# Patient Record
Sex: FEMALE | Race: OTHER | HISPANIC OR LATINO | Employment: FULL TIME | URBAN - METROPOLITAN AREA
[De-identification: names, ages, dates, MRNs, and addresses within clinical notes are randomized per-mention and may not be internally consistent; named-entity substitution may affect disease eponyms.]

---
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
## 2023-01-06 ENCOUNTER — TELEPHONE (OUTPATIENT)
Facility: HOSPITAL | Age: 18
End: 2023-01-06

## 2023-01-06 DIAGNOSIS — Z13.79 GENETIC TESTING: Primary | ICD-10-CM

## 2023-01-06 NOTE — TELEPHONE ENCOUNTER
----- Message from Patt Starkey MD sent at 1/5/2023  7:39 AM EST -----  I have reviewed the patient's lab results which are normal   Please contact the patient to inform her of the normal results, unless Ms Sal Guido has already reviewed the results using [address]

## 2023-01-06 NOTE — TELEPHONE ENCOUNTER
Left VMM for pt with results of NIPS, gender NOT provided  PT instructed on MSAFP being ordered by OB and timing of the test   Pt instructed to contact office with questions

## 2023-01-09 ENCOUNTER — PATIENT OUTREACH (OUTPATIENT)
Dept: OBGYN CLINIC | Facility: CLINIC | Age: 18
End: 2023-01-09

## 2023-01-18 ENCOUNTER — HOSPITAL ENCOUNTER (EMERGENCY)
Facility: HOSPITAL | Age: 18
Discharge: HOME | End: 2023-01-18
Attending: EMERGENCY MEDICINE

## 2023-01-18 VITALS
WEIGHT: 173 LBS | RESPIRATION RATE: 18 BRPM | OXYGEN SATURATION: 100 % | DIASTOLIC BLOOD PRESSURE: 79 MMHG | SYSTOLIC BLOOD PRESSURE: 137 MMHG | TEMPERATURE: 99.5 F | BODY MASS INDEX: 32.69 KG/M2 | HEART RATE: 110 BPM

## 2023-01-18 DIAGNOSIS — O98.512 COVID-19 AFFECTING PREGNANCY IN SECOND TRIMESTER: Primary | ICD-10-CM

## 2023-01-18 DIAGNOSIS — U07.1 COVID-19 AFFECTING PREGNANCY IN SECOND TRIMESTER: Primary | ICD-10-CM

## 2023-01-18 LAB
FLUAV RNA RESP QL NAA+PROBE: NEGATIVE
FLUBV RNA RESP QL NAA+PROBE: NEGATIVE
RSV RNA RESP QL NAA+PROBE: NEGATIVE
S PYO DNA THROAT QL NAA+PROBE: NOT DETECTED
SARS-COV-2 RNA RESP QL NAA+PROBE: POSITIVE

## 2023-01-18 NOTE — Clinical Note
Chandan Abid was seen and treated in our emergency department on 1/18/2023  Diagnosis:     Teodora    She may return on this date: 01/21/2023         If you have any questions or concerns, please don't hesitate to call        Jeff Glass DO    ______________________________           _______________          _______________  Hospital Representative                              Date                                Time

## 2023-01-19 NOTE — ED PROVIDER NOTES
History  Chief Complaint   Patient presents with   • Cold Like Symptoms     Started this morning  Feels like throat is swollen and having excess mucous  No meds PTA  21year old female, currently in 2nd semester of pregnancy, had a gender reveal party several days ago and may have been around sick people then  No with viral symptoms of sore throat, body aches and excess mucous  No abdominal pain  No urinary symptoms  No vaginal discharge or bleeding  Prior to Admission Medications   Prescriptions Last Dose Informant Patient Reported? Taking? Prenatal Vit-Fe Fumarate-FA (Prenatal Complete) 14-0 4 MG TABS   No No   Sig: Take 1 tablet by mouth in the morning   doxylamine (UNISOM) 25 MG tablet   No No   Sig: Take 1 tablet (25 mg total) by mouth daily at bedtime as needed for sleep      Facility-Administered Medications: None       Past Medical History:   Diagnosis Date   • Asthma        History reviewed  No pertinent surgical history  Family History   Problem Relation Age of Onset   • Hypertension Mother    • Diabetes Maternal Grandmother    • Cancer Maternal Grandmother      I have reviewed and agree with the history as documented  E-Cigarette/Vaping   • E-Cigarette Use Never User      E-Cigarette/Vaping Substances   • Nicotine No    • THC No    • CBD No    • Flavoring No    • Other No    • Unknown No      Social History     Tobacco Use   • Smoking status: Never   • Smokeless tobacco: Never   Vaping Use   • Vaping Use: Never used   Substance Use Topics   • Alcohol use: Not Currently   • Drug use: Not Currently       Review of Systems   Constitutional: Negative  Negative for chills and fever  HENT: Positive for congestion  Negative for rhinorrhea, sore throat, trouble swallowing and voice change  Eyes: Negative  Negative for pain and visual disturbance  Respiratory: Negative  Negative for cough, shortness of breath and wheezing  Cardiovascular: Negative    Negative for chest pain and palpitations  Gastrointestinal: Negative for abdominal pain, diarrhea, nausea and vomiting  Genitourinary: Negative  Negative for dysuria and frequency  Musculoskeletal: Positive for myalgias  Negative for neck pain and neck stiffness  Skin: Negative  Negative for rash  Neurological: Negative  Negative for dizziness, speech difficulty, weakness, light-headedness and numbness  Physical Exam  Physical Exam  Vitals and nursing note reviewed  Constitutional:       General: She is not in acute distress  Appearance: She is well-developed  HENT:      Head: Normocephalic and atraumatic  Eyes:      Conjunctiva/sclera: Conjunctivae normal       Pupils: Pupils are equal, round, and reactive to light  Neck:      Trachea: No tracheal deviation  Cardiovascular:      Rate and Rhythm: Normal rate and regular rhythm  Pulmonary:      Effort: Pulmonary effort is normal  No respiratory distress  Breath sounds: Normal breath sounds  No wheezing or rales  Abdominal:      General: Bowel sounds are normal  There is no distension  Palpations: Abdomen is soft  Tenderness: There is no abdominal tenderness  There is no guarding or rebound  Musculoskeletal:         General: No tenderness or deformity  Normal range of motion  Cervical back: Normal range of motion and neck supple  Skin:     General: Skin is warm and dry  Capillary Refill: Capillary refill takes less than 2 seconds  Findings: No rash  Neurological:      Mental Status: She is alert and oriented to person, place, and time     Psychiatric:         Behavior: Behavior normal          Vital Signs  ED Triage Vitals [01/18/23 1837]   Temperature Pulse Respirations Blood Pressure SpO2   99 5 °F (37 5 °C) (!) 110 18 137/79 100 %      Temp Source Heart Rate Source Patient Position - Orthostatic VS BP Location FiO2 (%)   Tympanic Monitor -- Left arm --      Pain Score       --           Vitals:    01/18/23 1837   BP: 137/79   Pulse: (!) 110         Visual Acuity      ED Medications  Medications - No data to display    Diagnostic Studies  Results Reviewed     Procedure Component Value Units Date/Time    FLU/RSV/COVID - if FLU/RSV clinically relevant [[id number]]  (Abnormal) Collected: 01/18/23 1845    Lab Status: Final result Specimen: Nares from Nose Updated: 01/18/23 1929     SARS-CoV-2 Positive     INFLUENZA A PCR Negative     INFLUENZA B PCR Negative     RSV PCR Negative    Narrative:      FOR PEDIATRIC PATIENTS - copy/paste COVID Guidelines URL to browser: [email or web address]  Virally    SARS-CoV-2 assay is a Nucleic Acid Amplification assay intended for the  qualitative detection of nucleic acid from SARS-CoV-2 in nasopharyngeal  swabs  Results are for the presumptive identification of SARS-CoV-2 RNA  Positive results are indicative of infection with SARS-CoV-2, the virus  causing COVID-19, but do not rule out bacterial infection or co-infection  with other viruses  Laboratories within the [place] and its  territories are required to report all positive results to the appropriate  public health authorities  Negative results do not preclude SARS-CoV-2  infection and should not be used as the sole basis for treatment or other  patient management decisions  Negative results must be combined with  clinical observations, patient history, and epidemiological information  This test has not been FDA cleared or approved  This test has been authorized by FDA under an Emergency Use Authorization  (EUA)  This test is only authorized for the duration of time the  declaration that circumstances exist justifying the authorization of the  emergency use of an in vitro diagnostic tests for detection of SARS-CoV-2  virus and/or diagnosis of COVID-19 infection under section 564(b)(1) of  the Act, 21 U  S C  163VQM-2(Q)(7), unless the authorization is terminated  or revoked sooner   The test has been validated but independent review by FDA  and CLIA is pending  Test performed using [employer] GeneXpert: This RT-PCR assay targets N2,  a region unique to SARS-CoV-2  A conserved region in the E-gene was chosen  for pan-Sarbecovirus detection which includes SARS-CoV-2  According to CMS-2020-01-R, this platform meets the definition of high-throughput technology  Strep A PCR [[id number]]  (Normal) Collected: 01/18/23 1845    Lab Status: Final result Specimen: Throat Updated: 01/18/23 1918     STREP A PCR Not Detected                 No orders to display              Procedures  Procedures         ED Course         CRAFFT    Flowsheet Row Most Recent Value   SBIRT (13-21 yo)    In order to provide better care to our patients, we are screening all of our patients for alcohol and drug use  Would it be okay to ask you these screening questions? No Filed at: 01/18/2023 1842                                          Medical Decision Making  Patient covid positive  No respiratory distress  No concerns for fetal distress or demise at this time  Patient aware of follow and return precautions  COVID-19 affecting pregnancy in second trimester: complicated acute illness or injury  Amount and/or Complexity of Data Reviewed  Labs: ordered  Disposition  Final diagnoses:   CTMNL-31 affecting pregnancy in second trimester     Time reflects when diagnosis was documented in both MDM as applicable and the Disposition within this note     Time User Action Codes Description Comment    1/18/2023  7:37 PM Chris Bridges Add [F27 214,  U07 1] COVID-19 affecting pregnancy in second trimester       ED Disposition     ED Disposition   Discharge    Condition   Stable    Date/Time   Wed Jan 18, 2023 [facility] discharge to home/self care                 Follow-up Information     Follow up With Specialties Details Why [address], MD Pediatrics   [address] [facility]  [address]  [facility] [address]  [address] [facility] Schedule an appointment as soon as possible for a visit   [address] [facility] [address] [address]  [address] [facility] [address]  [facility], [address] [facility] [address] [place], [place], [address] [phone]          Discharge Medication List as of 1/18/2023  7:38 PM      CONTINUE these medications which have NOT CHANGED    Details   doxylamine (UNISOM) 25 MG tablet Take 1 tablet (25 mg total) by mouth daily at bedtime as needed for sleep, Starting Thu 12/29/2022, Normal      Prenatal Vit-Fe Fumarate-FA (Prenatal Complete) 14-0 4 MG TABS Take 1 tablet by mouth in the morning, Starting Mon 10/24/2022, Normal             No discharge procedures on file      PDMP Review     None          ED Provider  Electronically Signed by           Joanna Teran DO  01/18/23 8543

## 2023-01-25 PROBLEM — Z3A.17 17 WEEKS GESTATION OF PREGNANCY: Status: ACTIVE | Noted: 2022-12-28

## 2023-01-26 ENCOUNTER — PATIENT OUTREACH (OUTPATIENT)
Dept: OBGYN CLINIC | Facility: CLINIC | Age: 18
End: 2023-01-26

## 2023-01-26 ENCOUNTER — ROUTINE PRENATAL (OUTPATIENT)
Dept: OBGYN CLINIC | Facility: CLINIC | Age: 18
End: 2023-01-26

## 2023-01-26 VITALS
DIASTOLIC BLOOD PRESSURE: 69 MMHG | WEIGHT: 171 LBS | BODY MASS INDEX: 32.31 KG/M2 | SYSTOLIC BLOOD PRESSURE: 107 MMHG | HEART RATE: 76 BPM

## 2023-01-26 DIAGNOSIS — Z13.31 POSITIVE DEPRESSION SCREENING: ICD-10-CM

## 2023-01-26 DIAGNOSIS — Z3A.17 17 WEEKS GESTATION OF PREGNANCY: ICD-10-CM

## 2023-01-26 DIAGNOSIS — E66.9 OBESITY (BMI 30.0-34.9): Primary | ICD-10-CM

## 2023-01-26 NOTE — PROGRESS NOTES
Patient returned call. She will not be moving forward with surgery at this time. She is feeling well and has no concerns. Patient said she would call if there was a change.    SOL BECERRA was referred to complete a PN SW assessment in the first trimester, pt also had a PHQ-9 at her last OB visit  Pt is Yemeni speaking, she is here today with her partner  She is now Georgiann Patient, TUNDE is 6/30/2023, SOL BECERRA met with pt and partner in the office today to complete the assessment, SOL BECERRA utilized the [employer] interpretor:     Pt reports this pregnancy was uplanned but is welcomed  Pt and FOB do not currently live together, however, they did find an apartment and are moving in together in feb/march of this year  Pt currently lives with her brother and his wife  Pt receives rides to appts from other people  She is currently not working, FOB is employed at the [employer]  Pt has MA and SNAP, her [facility] appt was Jan 12th but it never happened, SOL BECERRA enc her to call to re-schedule  Pt has no financial concerns obtaining supplies  She has good family support  Pt has no MH, D&A, CYS, IPV or legal concerns  Pt denies feeling anxious/depressed at this time  SOL BECERRA enc her to outreach for support if needed during this pregnancy, however, will not plan to follow

## 2023-01-26 NOTE — ASSESSMENT & PLAN NOTE
FHT - 138 bpm by doppler   Analgesia: plans to try without epidural   Tdap: Offer at 28 week visit   Flu: reports that she received flu shot in late December   Ultrasound: Last US 12/28/22 - anterior placenta, next [address] 2/10/23  Labs: prenatal labs wnl   Delivery consent to be signed at 28 weeks   Contraception: plans for nexplanon   Delivery Plan: Plans for vaginal delivery   Next PNV 4 weeks

## 2023-01-26 NOTE — PROGRESS NOTES
[address]  [id number]  2005        A/P:  Problem List Items Addressed This Visit        Other    17 weeks gestation of pregnancy     FHT - 138 bpm by doppler   Analgesia: plans to try without epidural   Tdap: Offer at 28 week visit   Flu: reports that she received flu shot in late December   Ultrasound: Last US 12/28/22 - anterior placenta, next [address] 2/10/23  Labs: prenatal labs wnl   Delivery consent to be signed at 28 weeks   Contraception: plans for nexplanon   Delivery Plan: Plans for vaginal delivery   Next PNV 4 weeks           Obesity (BMI 30 0-34 9) - Primary     Early 1hr GTT normal          Positive depression screening     12/29 PHQ9 with a score of 12   Social work to meet with patient today                S: 21 y o  Lobo العراقي 17w5d here for PN visit  She denies contractions  She denies leakage of fluid and vaginal bleeding  She reports that she has just started to feel fetal movement  O:  Vitals:    01/26/23 0955   BP: 107/69   Pulse: 76     Physical Exam  Constitutional:       General: She is not in acute distress  Appearance: Normal appearance  HENT:      Head: Normocephalic and atraumatic  Cardiovascular:      Rate and Rhythm: Normal rate  Pulmonary:      Effort: Pulmonary effort is normal    Abdominal:      Palpations: Abdomen is soft  Tenderness: There is no abdominal tenderness  Skin:     General: Skin is warm and dry  Neurological:      General: No focal deficit present  Mental Status: She is alert     Psychiatric:         Mood and Affect: Mood normal          Fetal Heart Rate: 138       D/w Dr Blue Claudio MD  OB/GYN PGY-2  1/26/2023  10:41 AM

## 2023-02-10 ENCOUNTER — ROUTINE PRENATAL (OUTPATIENT)
Dept: PERINATAL CARE | Facility: CLINIC | Age: 18
End: 2023-02-10

## 2023-02-10 VITALS
WEIGHT: 174.4 LBS | HEIGHT: 62 IN | HEART RATE: 95 BPM | BODY MASS INDEX: 32.09 KG/M2 | DIASTOLIC BLOOD PRESSURE: 64 MMHG | SYSTOLIC BLOOD PRESSURE: 122 MMHG

## 2023-02-10 DIAGNOSIS — Z3A.20 20 WEEKS GESTATION OF PREGNANCY: ICD-10-CM

## 2023-02-10 DIAGNOSIS — O99.210 OBESITY AFFECTING PREGNANCY, ANTEPARTUM: Primary | ICD-10-CM

## 2023-02-10 DIAGNOSIS — Z36.86 ENCOUNTER FOR ANTENATAL SCREENING FOR CERVICAL LENGTH: ICD-10-CM

## 2023-02-10 NOTE — PROGRESS NOTES
The patient was seen today for an ultrasound  Please see ultrasound report (located under Ob Procedures) for additional details  Thank you very much for allowing us to participate in the care of this very nice patient  Should you have any questions, please do not hesitate to contact me  Jude Gonzalez MD [id number]  Attending Physician, Bobby

## 2023-02-10 NOTE — PROGRESS NOTES
Ultrasound Probe Disinfection    A transvaginal ultrasound was performed  Prior to use, disinfection was performed with High Level Disinfection Process (Trophon)  Probe serial number [id number]: [id number] was used        Yogi Napier  02/10/23  1:11 PM

## 2023-02-24 ENCOUNTER — ROUTINE PRENATAL (OUTPATIENT)
Dept: OBGYN CLINIC | Facility: CLINIC | Age: 18
End: 2023-02-24

## 2023-02-24 VITALS
HEART RATE: 90 BPM | SYSTOLIC BLOOD PRESSURE: 125 MMHG | WEIGHT: 175 LBS | BODY MASS INDEX: 32.2 KG/M2 | DIASTOLIC BLOOD PRESSURE: 81 MMHG | HEIGHT: 62 IN

## 2023-02-24 DIAGNOSIS — Z34.92 PRENATAL CARE IN SECOND TRIMESTER: Primary | ICD-10-CM

## 2023-02-24 DIAGNOSIS — Z3A.22 22 WEEKS GESTATION OF PREGNANCY: ICD-10-CM

## 2023-02-24 NOTE — PATIENT INSTRUCTIONS
The Third Trimester  (28-42 weeks)  YOUR BABY   * your baby sucks its thumb now! * your baby can hear voices and respond to touch…   so talk to him or her!!   * your baby’s brain grows and develops most in the last 2 months of pregnancy   * baby’s head and bones are soft and flexible so they can fit through the birth canal   * baby’s movements change towards the end of pregnancy because there is less room for kicking and stretching in your belly   * baby’s lungs are not fully developed and completely ready to breathe on their own until the last 3-4 weeks before your due date    YOUR BODY   * your belly is growing a lot now   * it may become more difficult to sleep well at night or to be as active as you usually are   * you may sweat more than usual   * you will become more off-balance……be careful not to fall!!   * you may develop hemorrhoids (which can be painful and make it difficult to sit down)   * the last two months of pregnancy can become very uncomfortable……with backaches, headaches, and heartburn   * you can start to have contractions……  as long as they are irregular and less than 5 per hour, this is a normal part of your body getting ready to have a baby   * your cervix may start to thin out and open up……to get ready for delivery   * you may find yourself needing to “pee” very often……  because baby is pressing on your bladder so much   * you may get out of breathe more quickly than usual      FETAL KICK COUNTS    In the third trimester (after 28 weeks gestation) you should be performing fetal kick counts every day  Your baby should move at least 10 times in 2 hours during an active time, once a day  Choose atime of day when your baby is most active  Try to do this around the same time each day  Get into a comfortable position and then write down the time your baby first moves  Count each movement until the baby moves 10 times  These movements include kicks, punches, nudges, flutters, or rolls    This can take anywhere from 5 minutes to 2 hours  Write down the time you feel the baby's 10th movement  If 2 hours has passed and your baby has not moved at least 10 times, you should CALL THE OFFICE RIGHT AWAY  [phone]  PREMATURE LABOR     When to call [phone]:  * I need to call immediately if I have even a small amount of LIQUID leaking from my vagina, with or without contractions  * I need to call if I am BLEEDING from my vagina  * I need to call if I am feeling CRAMPING that continues after drinking 2-3 glasses of water and lying down on my side for one hour and that feels like I am having a period  * I need to call if I feel CONTRACTIONS  more than 4 times in an hour that feels like the baby is “balling up” even after I try drinking 2-3 glasses of water and lying down on my side for an hour  * I need to call if I notice a change in my vaginal DISCHARGE  * I need to call if I am feeling PELVIC PRESSURE  that feels like the baby is pushing down into my vagina and lasts more than an hour  * I need to call if I have LOW BACKACHE which is new and near my tailbone  It may either come and go several times during an hour or stay there constantly  PRE-ECLAMPSIA     What is it? Pre-eclampsia is a serious disease that can occur during pregnancy related to high blood pressures  It can happen to any woman  Why should I care? Women who develop pre-eclampsia have serious risks which can include seizures, stroke, organ damage, premature birth of their baby  In the very worst cases, it can cause death of the mother and/or their baby  What should I pay attention to?    Signs and symptoms of pre-eclampsia can include:   * Severe swelling of face or hands    * A headache that will not go away even after you have taken Tylenol   * Seeing spots or changes in eyesight    * Pain in the upper abdomen or shoulder    * New nausea and vomiting (in the second half of pregnancy)    * Sudden weight gain    * Difficulty breathing     What should I do? If you experience any of the above symptoms of pre-eclampsia, contact your OB provider  Finding pre-eclampsia early is important for you and your baby  Call us at [phone]  [facility] BREASTFEEDING     BENEFITS FOR BABIES   * stronger immune systems (less allergies, eczema, asthma, and childhood cancers)   * less diarrhea and constipation or other GI diseases   * fewer colds and ear infections   * better vision and teeth (fewer cavities)   * improves IQ   * lower rates of diabetes and obesity in childhood     BENEFITS FOR MOMS   * promotes faster weight loss after delivery   * lower risk for postpartum depression   * lower risk for breast, uterine, and ovarian cancers   * lower risk for osteoporosis developing with age   * easier than formula - is always right with you, clean, and the right temperature   * less expensive than formula……it’s FREE !!!!     KEYS TO SUCCESSFUL BREASTFEEDING   * keep baby skin-to-skin until after first feeding event   * keep baby in your room with you during your hospital stay after delivery   * avoid any bottle feedings (unless medically necessary)   * limit the use of pacifiers and swaddling   * ask for help if you are having any issues……lactation consultants (who specialize in breastfeeding) are available to help you   * a healthy diet for mom……eating a variety of foods and portions in moderation    THINGS YOU SHOULD KNOW ABOUT BREASTFEEDING   * most medications are considered compatible with breastfeeding by the [address] [place] [address] Academy of Pediatrics, but you should check with your health care provider or lactation consultant prior to taking a new medication……just to be sure it is safe   * alcohol (beer, wine, liquor) can be passed from mother to baby through breast milk……an occasional, social drink is deemed acceptable by the American Academy of [facility]   more than that should be avoided   * breastfeeding is NOT an effective method of birth control   * nicotine (in cigarettes) can pass from mother to baby through breast milk…   however, for mothers who smoke, it is still healthier to breastfeed than use formula   * caffeine should be limited to 1-3 cups per day……includes coffee, soda, energy drinks         PERINEAL / VAGINAL MASSAGE    What can I do now to decrease my chances of tearing during delivery? Massaging around the vaginal opening by you (or your partner), either antepartum (before birth) or during the second stage of labor, can reduce the likelihood of perineal tearing during childbirth  Likewise, the use of warm packs held on the perineum during the pushing stage of labor can reduce the severity of your tear  This will happen during the pushing stage of labor  At home, you can also help reduce the chances of injury that may occur during the birth of your child through perineal massage  When should I do this? Starting around or shortly after 34 weeks of pregnancy, you or your partner should provide 5-10 minutes of vaginal massage 1-4 times per week  How? Use either almond, coconut, or olive oil and water mixture on 1 or 2 fingers (depending on comfort)  Insert finger(s) 3-5cm into the vagina  Apply sweeping downward/sideward pressure from 3 to 9 o'clock for 5-10 minutes, 1-4 times per week  WARNING SIGNS DURING PREGNANCY  Call our office at [phone] if you experience any of the followin  Vaginal bleeding  2  Sharp abdominal pain that does not go away  3  Fever (more than 100 4 and is not relieved by Tylenol)  4  Persistent vomiting lasting greater than 24 hours  5  Chest pain   6  Pain or burning when you urinate  7  Severe headache that doesn't resolve with Tylenol  8  Blurred vision or seeing spots in your vision  9  Sudden swelling of your face or hands  10  Redness, swelling or pain in a leg  11  A sudden weight gain in just a few days  12   Decrease in your baby's movement (after 28 weeks or the 6th month of pregnancy)  13  A loss of watery fluid from your vagina - can be a gush, a trickle or continuous wetness  14  After 20 weeks of pregnancy, rhythmic cramping (greater than 4 per hour) or menstrual like low/pelvic pain          VACCINES IN PREGNANCY    TDAP  Whooping cough (or pertussis) can be serious for anyone, but for your , it can be life-threatning  Up to 20 babies die each year in the [place]  Due to whooping cough  About half of babies younger than 3year old who get whooping cough need treatment in the hospital   The younger the baby is when he or she gets whooping cough, the more likely he or she will need to be treated in a hospital   When you receive the whooping cough vaccine (Tdap) during your pregnancy, your body will create protective antibodies and pass some of them to your baby before birth  These antibodies can help protect your baby from getting whooping cough until they are old enough to be vaccinated themselves (usually around 7 months of age)  INFLUENZA  Changes in your immune, heart, and lung functions during pregnancy make you more likely to get seriously ill from the flu  Catching the flu also increases your chances for serious problems for your developing baby, including premature labor and delivery  It is recommended that all women who are pregnant during flu season should receive an influenza vaccine  Coronavirus (OYCPS-34) pregnancy FAQs: Medical experts answer your questions  From [employer] cy  com/0_coronavirus-covid-19-pregnancy-faq-medical-hgimwpt-whxbgc-ds_83328659  bc (courtesy of [facility])  As of 3/18/20  By Darryl Cartagena 37  Medically reviewed by Society for Maternal-Fetal Medicine       We asked parents-to-be to send us their most pressing questions about coronavirus (COVID-19)  Among them: Is it still safe to deliver in a hospital? What if my ob-gyn has the virus?  We sent those questions to the top docs at the [facility]  Here are their answers  The coronavirus (COVID-19) pandemic has been declared a national emergency in the [facility] by [employer]  Moms-to-be like you are concerned about everything from getting medicines to managing disruptions at work  But above and beyond any worry about lifestyle changes is a focus on your health and the impact of COVID-19 on your pregnancy  We asked obstetrics doctors who handle the most complicated pregnancy issues to answer your questions about the coronavirus  Here are their responses, provided by Dr Danae Oconnor and her colleagues at the [facility]  Am I at more risk for COVID-19 if I'm pregnant? We don't know  Pregnancy does change your immune system in ways that might make you more susceptible to viral respiratory infections like COVID-19  If you become infected, you might also be at higher risk for more severe illness compared to the general population  Although this does not appear to be the case with COVID-19, based on limited data so far, a higher risk has been true for pregnant women with other coronavirus infections (SARS-CoV and MERS-CoV) and other viral respiratory infections, such as flu  It's important to take preventive actions to avoid infection, such as washing your hands often and avoiding people who are sick  How might coronavirus affect my pregnancy? Again, we don't know  Women with other coronavirus infections (such as SARS-CoV) did not have miscarriage or stillbirth at higher rates than the general population  We know that having other respiratory viral infections during pregnancy, such as flu, has been associated with problems like low birth weight and  birth  Also, having a high fever early in pregnancy may increase the risk of certain birth defects  Could I transmit coronavirus to my baby during pregnancy or delivery?   We don't know whether you could transmit COVID-19 to your baby before or during delivery  However, among the few case studies of infants born to mothers with COVID-23 published in peer-reviewed literature, none of the infants tested positive for the virus  Also, there was no virus detected in samples of amniotic fluid or breast milk  There have been a few reports of newborns as young as a few days old with infection, suggesting that a mother can transmit the infection to her infant through close contact after delivery  There have been no reports of mother-to-baby transmission for other coronaviruses (MERS-CoV and SARS-CoV), although only limited information is available  Is it safe for me to deliver at a hospital where there have been COVID-19 cases? Yes  We know that COVID-19 is a very scary virus  The good news is that hospitals are taking great precautions to keep patients and healthcare providers safe  When a patient is even suspected to have COVID-19, they are placed in a negative pressure room  (Think of these rooms as vacuums that suck and filter the air so it's safe for the other people in the hospital)  This makes it possible for you to deliver at the hospital without putting you or your baby at risk  Hospitals are also implementing stricter visiting policies to keep patients safe  It's worth calling your hospital to check if there are any new regulations to be aware of  What plans should I make now in case the hospital system is overwhelmed when it's time for me to deliver? This is a great question to talk with your doctor or midwife about when you're 34 to 36 weeks pregnant  Every hospital is making different plans for dealing with this scenario  I work in healthcare  Should I ask my doctor to excuse me from work until the baby is born? What if I work in a school, the travel [address], or some other high-risk setting?   Healthcare facilities should take care to limit the exposure of pregnant employees to patients with confirmed or suspected COVID-19, just as they would with other infectious cases  If you continue working, be sure to follow the CDC's risk assessment and infection control guidelines  If you work in a school, travel [employer] [place], or other high-risk setting, talk with your employer about what it's doing to protect employees and minimize infection risks  Wash your hands often  What if my OB gets COVID-19? If your doctor or midwife tests positive for COVID-19, they will need to be quarantined until they recover and are no longer at risk of transmitting the virus  In this case, you'll be assigned to another OB in your doctor's practice (or you may choose another practitioner yourself)  Ask your new OB or your doctor's office if you should self-quarantine or be tested for the virus  (It will depend on when you last saw your provider and when that person tested positive )    Should we hold off on trying to conceive because of COVID-19? At this time, there's no reason to hold off on trying to get pregnant, but the data we have is really limited  For example, we don't think the virus causes birth defects or increases your risk of miscarriage  But we don't know whether you could transmit COVID-19 to your baby before or during delivery  We also don't know if the virus lives in semen or can be sexually transmitted  We have a babymoon scheduled in the next few months - should we cancel? If you're planning to travel internationally or on a cruise, you should strongly consider canceling  At this time, the virus has reached more than 140 countries, and there are travel bans to [place], most of [place], and [place]  Places where large numbers of people gather are at highest risk, especially airports and cruise ships  If you're planning travel in the [place] , note that any travel setting increases your risk of exposure, and there may be travel bans even in [place] by the time you're scheduled to go   Even if you're state is not blocked, you'll definitely want to avoid traveling to communities where the virus is spreading  To find out where these places are, check The [place] Times map based on CDC data  For the most current advice to help you avoid exposure, check the CDC's COVID-19 travel page  Will the hospital separate me from my  and keep the baby in quarantine? If you test positive for COVID-19 or have been exposed but have no symptoms, the CDC, [employer] of Obstetricians and Gynecologists, and the Society for [place] [address] all recommend that you be  from your baby to decrease the risk of transmission to the baby  This would last until you are no longer at risk of transmitting the virus  If you do not have COVID-19 and have not been exposed to the virus, the hospital will not separate you from your   My hospital is restricting visitors and only allowing one support person  If my support person leaves after the delivery, will they be allowed to come back? Every hospital has different policies  Contact your hospital or labor and delivery unit a week or so before delivery to get the most up-to-date restrictions  In general, if your support person needs to leave, they would be allowed back unless they knew they were exposed to COVID-19 after leaving your company  [facility] understands that the coronavirus (COVID-19) pandemic is an evolving story and that your questions will change over time  We'll continue asking moms and dads in our Community what they want to know, and we'll get the answers from experts to keep them - and you - informed and supported  My mom was planning to fly here to help me care for my new baby after delivery  Should I tell her not to come? Yes  If your mom is over 61 or has any serious chronic medical conditions (such as heart disease, lung disease, or diabetes), she is at higher risk of serious illness from COVID-19 and should avoid air travel   And remember that any travel setting increases a person's risk of exposure  So, it may be risky to have her around the baby after she has been traveling  For the most current advice on traveling, check the CDC's COVID-19 travel page  [facility] understands that the coronavirus pandemic is an evolving story and that your questions will change over time  We'll continue asking moms and dads in our Community what they want to know, and we'll get the answers from experts to keep them - and you - informed and supported       Return in 4 weeks

## 2023-03-13 ENCOUNTER — TELEPHONE (OUTPATIENT)
Dept: PERINATAL CARE | Facility: OTHER | Age: 18
End: 2023-03-13

## 2023-03-13 NOTE — TELEPHONE ENCOUNTER
Left patient a message that her 3:15 M appointment on 5/5/23 had to be rescheduled from [place] to [place] office  The new time, date and location were provided  The patient has been instructed to please call us back at [phone] with any questions or concerns

## 2023-03-23 ENCOUNTER — APPOINTMENT (OUTPATIENT)
Dept: LAB | Facility: HOSPITAL | Age: 18
End: 2023-03-23

## 2023-03-23 ENCOUNTER — ROUTINE PRENATAL (OUTPATIENT)
Dept: OBGYN CLINIC | Facility: CLINIC | Age: 18
End: 2023-03-23

## 2023-03-23 VITALS
HEART RATE: 89 BPM | SYSTOLIC BLOOD PRESSURE: 111 MMHG | DIASTOLIC BLOOD PRESSURE: 71 MMHG | WEIGHT: 179.6 LBS | BODY MASS INDEX: 32.85 KG/M2

## 2023-03-23 DIAGNOSIS — Z34.92 PRENATAL CARE IN SECOND TRIMESTER: Primary | ICD-10-CM

## 2023-03-23 DIAGNOSIS — Z34.92 PRENATAL CARE, SECOND TRIMESTER: ICD-10-CM

## 2023-03-23 PROBLEM — Z3A.25 25 WEEKS GESTATION OF PREGNANCY: Status: ACTIVE | Noted: 2022-12-28

## 2023-03-23 LAB
ERYTHROCYTE [DISTWIDTH] IN BLOOD BY AUTOMATED COUNT: 13.2 % (ref 11.6–15.1)
HCT VFR BLD AUTO: 32.9 % (ref 34.8–46.1)
HGB BLD-MCNC: 10.5 G/DL (ref 11.5–15.4)
MCH RBC QN AUTO: 28.1 PG (ref 26.8–34.3)
MCHC RBC AUTO-ENTMCNC: 31.9 G/DL (ref 31.4–37.4)
MCV RBC AUTO: 88 FL (ref 82–98)
PLATELET # BLD AUTO: 174 THOUSANDS/UL (ref 149–390)
PMV BLD AUTO: 10.9 FL (ref 8.9–12.7)
RBC # BLD AUTO: 3.74 MILLION/UL (ref 3.81–5.12)
TREPONEMA PALLIDUM IGG+IGM AB [PRESENCE] IN SERUM OR PLASMA BY IMMUNOASSAY: NORMAL
WBC # BLD AUTO: 9.97 THOUSAND/UL (ref 4.31–10.16)

## 2023-03-23 NOTE — PROGRESS NOTES
OB/GYN prenatal visit    S: 21 y o  Jaki Pastrana 25w6d here for PN visit  She has no obstetric complaints, including pelvic pain, contractions, vaginal bleeding, loss of fluid, or decreased fetal movement       O:  Vitals:    23 1132   BP: 111/71   Pulse: 89       Gen: no acute distress, nonlabored breathing  Fundal Height:S=D  FHR: 140's via doppler    A/P:    Problem List        Other    25 weeks gestation of pregnancy    Overview     - Continue prenatal vitamin  - Prenatal labs: wnl  - Initial OB U/S: wnl  - Genetic screening: wnl  - Level 2 U/S on 23  - 28 week labs ordered  - Tdap at 28 weeks   - Contraception plan: [Nexplanon]   - All questions answered to patient satisfaction         Obesity (BMI 30 0-34 9)    Overview     Early 1hr GTT normal         Vaccine counseling    Positive depression screening    Overview     : PHQ-9 = 12  Referral placed to social work         Genetic testing    Overview     NIPT normal         Obesity affecting pregnancy, antepartum   Other Visit Diagnoses     Prenatal care in second trimester    -  Primary            Discussed  labor precautions and fetal kick counts    Return to clinic in 2 weeks    COVID 19 precautions were discussed with patient at length, reviewed symptoms, hygiene, social distancing, patient to call office  with Dominique Means MD  3/23/2023  1:29 PM

## 2023-04-11 PROBLEM — Z3A.28 28 WEEKS GESTATION OF PREGNANCY: Status: ACTIVE | Noted: 2022-12-28

## 2023-04-16 ENCOUNTER — HOSPITAL ENCOUNTER (OUTPATIENT)
Facility: HOSPITAL | Age: 18
Discharge: HOME | End: 2023-04-16
Attending: OBSTETRICS & GYNECOLOGY | Admitting: OBSTETRICS & GYNECOLOGY

## 2023-04-16 ENCOUNTER — HOSPITAL ENCOUNTER (EMERGENCY)
Facility: HOSPITAL | Age: 18
End: 2023-04-16
Attending: EMERGENCY MEDICINE

## 2023-04-16 VITALS
HEART RATE: 77 BPM | BODY MASS INDEX: 33.31 KG/M2 | RESPIRATION RATE: 18 BRPM | DIASTOLIC BLOOD PRESSURE: 63 MMHG | WEIGHT: 181 LBS | TEMPERATURE: 97.9 F | SYSTOLIC BLOOD PRESSURE: 118 MMHG | HEIGHT: 62 IN

## 2023-04-16 VITALS
SYSTOLIC BLOOD PRESSURE: 123 MMHG | HEIGHT: 62 IN | DIASTOLIC BLOOD PRESSURE: 79 MMHG | HEART RATE: 97 BPM | BODY MASS INDEX: 33.34 KG/M2 | WEIGHT: 181.2 LBS | OXYGEN SATURATION: 98 % | TEMPERATURE: 97.9 F | RESPIRATION RATE: 12 BRPM

## 2023-04-16 DIAGNOSIS — Z3A.29 29 WEEKS GESTATION OF PREGNANCY: Primary | ICD-10-CM

## 2023-04-16 DIAGNOSIS — O47.00 PRETERM CONTRACTIONS: Primary | ICD-10-CM

## 2023-04-16 PROBLEM — O99.013 ANEMIA AFFECTING PREGNANCY IN THIRD TRIMESTER: Status: ACTIVE | Noted: 2023-04-16

## 2023-04-16 RX ORDER — DOCUSATE SODIUM 100 MG/1
100 CAPSULE, LIQUID FILLED ORAL 2 TIMES DAILY
Qty: 120 CAPSULE | Refills: 0 | Status: SHIPPED | OUTPATIENT
Start: 2023-04-16 | End: 2023-06-15

## 2023-04-16 NOTE — PROCEDURES
Tori Chong, nick  at 29w2d with an TUNDE of 2023, by Ultrasound, was seen at [address] for the following procedure(s): $Procedure Type: US - Transvaginal]                   Ultrasound Other  Cervical Length: 3 76  Funnel: No  Debris: No  Placenta Previa: No  Vasa Previa: No                     Ultrasound Probe Disinfection    A transvaginal ultrasound was performed     Prior to use, disinfection was performed with High Level Disinfection Process (Trophon)      Pepe Hopper MD  23  3:46 PM

## 2023-04-16 NOTE — PROGRESS NOTES
L&D Triage Note - OB/GYN  Yola Henriquez 21 y o  female MRN: [id number]  Unit/Bed#: LD TRIAGE 3 Encounter: [id number]      ASSESSMENT:    Yola Henriquez is a 21 y o   at 29w2d presents with cramping   labor workup negative  Low suspicion for labor or fetal distress at this time  Suitable for discharge home with precautions  PLAN:    1) rule out  labor   -slides: negative   -TVUS: 3 59cm   -FHT: reactive, baseline 130   -[place]: quiet    2) Continue routine prenatal care  3) Discharge from [facility] triage with  labor precautions    - Reviewed rupture of membranes, false vs true labor, decreased fetal movement, and vaginal bleeding   - Pt to call provider with any concerns and follow up at her next scheduled prenatal appointment   - Case discussed with Dr Herr Rajani:    Yola Henriquez 21 y o   at 29w2d with an Estimated Date of Delivery: 23 She comes in as a transfer from [facility] for cramping at 29w  She says that typically she feels a cramp in the morning that lasts for about a minute and the same thing again at night  They do not persist past that one time  She thinks it may just be due to constipation, but she wanted to be safe and be evaluated  She was recently started on PO iron for anemia  She denies LOF, VB  Endorses good FM  Her current obstetrical history is uncomplicated      Contractions: as per HPI  Leakage of fluid: denies  Vaginal Bleeding: denies  Fetal movement: present    OBJECTIVE:    Vitals:    23 1440   BP: 118/63   Pulse: 77   Resp: 18   Temp: 97 9 °F (36 6 °C)       ROS:  Constitutional: Negative  Respiratory: Negative  Cardiovascular: Negative    Gastrointestinal: Negative    General Physical Exam:  General: in no apparent distress, alert and cooperative  Cardiovascular: Cor RRR  Lungs: non-labored breathing  Abdomen: abdomen is soft without significant tenderness, masses, organomegaly or guarding  Lower extremeties: nontender    Cervical Exam  Speculum: Cervical os appears closed, physiologic discharge  SVE: Patient declined SVE at this time      Fetal monitoring:  FHT:  130 bpm/ Moderate 6 - 25 bpm /10 x 10 accelerations present, no decelerations  [place]: quiet     KOH/WTMT:     Infection:   - neg clue cells    - neg hyphae   - neg trichomonads present    Membrane status   - neg ferning   - pos nitrazene (patient had intercourse earlier today)   - neg pooling     Imaging:       TVUS   - Cervical length    - 3 76cm    - 3 69cm    - 4 17cm   - Presentation: vertex    Rizwan Castillo MD  OBGYN PGY-1  4/16/2023 3:21 PM

## 2023-04-16 NOTE — ED PROVIDER NOTES
History  Chief Complaint   Patient presents with   • Back Pain     Patient reports that she is 29 weeks pregnant and has mid back and bilateral side pain since yesterday, lasts for 1 min  Denies any vaginal bleeding or discharge  Denies heavy lifting  Did not contact OB  Reports problems with constipation  Patient is a 40-year-old female coming in for evaluation of back pain that radiates to her epigastric  Patient reports that it does feel like contractions, last about 1 minute  Patient denies any vaginal bleeding, or vaginal discharge  Patient denies any trauma      History provided by:  Patient   used: Yes    Abdominal Cramping  Pain location:  Epigastric  Associated symptoms: no chest pain, no dysuria, no fever, no flatus, no nausea, no sore throat and no vomiting        Prior to Admission Medications   Prescriptions Last Dose Informant Patient Reported? Taking? Prenatal Vit-Fe Fumarate-FA (Prenatal Complete) 14-0 4 MG TABS   No No   Sig: Take 1 tablet by mouth in the morning   doxylamine (UNISOM) 25 MG tablet   No No   Sig: Take 1 tablet (25 mg total) by mouth daily at bedtime as needed for sleep   Patient not taking: Reported on 2/24/2023   ferrous sulfate 324 (65 Fe) mg   No No   Sig: Take 1 tablet (324 mg total) by mouth 2 (two) times a day before meals      Facility-Administered Medications: None       Past Medical History:   Diagnosis Date   • Asthma        History reviewed  No pertinent surgical history  Family History   Problem Relation Age of Onset   • Hypertension Mother    • Diabetes Maternal Grandmother    • Cancer Maternal Grandmother      I have reviewed and agree with the history as documented      E-Cigarette/Vaping   • E-Cigarette Use Never User      E-Cigarette/Vaping Substances   • Nicotine No    • THC No    • CBD No    • Flavoring No    • Other No    • Unknown No      Social History     Tobacco Use   • Smoking status: Never   • Smokeless tobacco: Never   Vaping Use   • Vaping Use: Never used   Substance Use Topics   • Alcohol use: Not Currently   • Drug use: Not Currently       Review of Systems   Constitutional: Negative for fever  HENT: Negative for sore throat  Cardiovascular: Negative for chest pain  Gastrointestinal: Positive for abdominal pain  Negative for flatus, nausea and vomiting  Genitourinary: Negative for dysuria  Physical Exam  Physical Exam  Vitals reviewed  Constitutional:       Appearance: Normal appearance  She is normal weight  HENT:      Head: Normocephalic and atraumatic  Right Ear: External ear normal       Left Ear: External ear normal       Nose: Nose normal    Eyes:      Conjunctiva/sclera: Conjunctivae normal    Cardiovascular:      Rate and Rhythm: Normal rate  Pulmonary:      Effort: Pulmonary effort is normal    Abdominal:      Palpations: Abdomen is soft  Musculoskeletal:         General: Normal range of motion  Cervical back: Normal range of motion  Skin:     General: Skin is warm and dry  Neurological:      Mental Status: She is alert           Vital Signs  ED Triage Vitals [04/16/23 1211]   Temperature Pulse Respirations Blood Pressure SpO2   97 9 °F (36 6 °C) 97 12 146/87 98 %      Temp Source Heart Rate Source Patient Position - Orthostatic VS BP Location FiO2 (%)   Oral Monitor Sitting Left arm --      Pain Score       5           Vitals:    04/16/23 1211 04/16/23 1224   BP: 146/87 123/79   Pulse: 97    Patient Position - Orthostatic VS: Sitting          Visual Acuity      ED Medications  Medications - No data to display    Diagnostic Studies  Results Reviewed     None                 No orders to display              Procedures  Procedures         ED Course  ED Course as of 04/16/23 1730   Sun Apr 16, 2023   1245 Spoke to Dr Brennan Christian, who would like patient transferred to [facility] for l/d assessment   1249 Fetal HR done by myself, 135         CRAFFT    Flowsheet Row Most Recent Value   ECHO "Initial Screen: During the past 12 months, did you:    1  Drink any alcohol (more than a few sips)? No Filed at: 2023 1215   2  Smoke any marijuana or hashish No Filed at: 2023 1215   3  Use anything else to get high? (\"anything else\" includes illegal drugs, over the counter and prescription drugs, and things that you sniff or 'jorgensen')? No Filed at: 2023 1215                                          Medical Decision Making  Patient is a 10-year-old female coming in for abdominal cramping  Patient is approximate 28 weeks pregnant  I consulted OB GYN, who recommended transfer to [facility] for further evaluation        Disposition  Final diagnoses:    contractions     Time reflects when diagnosis was documented in both MDM as applicable and the Disposition within this note     Time User Action Codes Description Comment    2023 12:45 PM Ashley Hodges Add [O47 00]  contractions       ED Disposition     ED Disposition   Transfer to Another Facility-In Network    Condition   --    Date/Time   Sun 2023 12:44 PM    Comment   Anderson Ibrahim should be transferred out to [facility]             MD Documentation    Shineha Izzy Most Recent Value   Patient Condition The patient has been stabilized such that within reasonable medical probability, no material deterioration of the patient condition or the condition of the unborn child(kriss) is likely to result from the transfer   Reason for Transfer Level of Care needed not available at this facility  [OB/gyn]      RN Documentation    Flowsheet Row Most Recent Value   Level of Care Basic life support      Follow-up Information    None         Discharge Medication List as of 2023  1:54 PM      CONTINUE these medications which have NOT CHANGED    Details   doxylamine (UNISOM) 25 MG tablet Take 1 tablet (25 mg total) by mouth daily at bedtime as needed for sleep, Starting Thu 2022, Normal      ferrous sulfate 324 (65 Fe) " mg Take 1 tablet (324 mg total) by mouth 2 (two) times a day before meals, Starting Tue 4/11/2023, Until Mon 7/10/2023, Normal      Prenatal Vit-Fe Fumarate-FA (Prenatal Complete) 14-0 4 MG TABS Take 1 tablet by mouth in the morning, Starting Mon 10/24/2022, Normal             No discharge procedures on file      PDMP Review     None          ED Provider  Electronically Signed by           Shirin Allen PA-C  04/16/23 1528
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
## 2023-04-27 ENCOUNTER — ROUTINE PRENATAL (OUTPATIENT)
Dept: OBGYN CLINIC | Facility: CLINIC | Age: 18
End: 2023-04-27

## 2023-04-27 VITALS
BODY MASS INDEX: 33.16 KG/M2 | DIASTOLIC BLOOD PRESSURE: 73 MMHG | HEIGHT: 62 IN | SYSTOLIC BLOOD PRESSURE: 114 MMHG | WEIGHT: 180.2 LBS | HEART RATE: 92 BPM

## 2023-04-27 DIAGNOSIS — Z3A.30 30 WEEKS GESTATION OF PREGNANCY: ICD-10-CM

## 2023-04-27 DIAGNOSIS — Z34.93 PRENATAL CARE IN THIRD TRIMESTER: Primary | ICD-10-CM

## 2023-04-27 NOTE — PROGRESS NOTES
"Assessment & Plan  21 y o  Kenneth Haskins at 30w6d presenting for routine prenatal visit  Explained 28 week labs WNL  Birth Plan was returned  Pt was seen 4/16/2023  In L&D with cramping and labor was R/O, pt denies CTX today     Problem List Items Addressed This Visit    None  Visit Diagnoses     Prenatal care in third trimester    -  Primary    30 weeks gestation of pregnancy            ____________________________________________________________  Subjective  She is without complaint  She denies contractions, loss of fluid, or vaginal bleeding  She feels regular fetal movements    WNL respiratory effort, negative cough or SOB  Objective  /73   Pulse 92   Ht 5' 2\" (1 575 m)   Wt 81 7 kg (180 lb 3 2 oz)   LMP 09/10/2022 (Exact Date)   BMI 32 96 kg/m²          Patient's Active Problem List  Patient Active Problem List   Diagnosis   • 29 weeks gestation of pregnancy   • Obesity (BMI 30 0-34  9)   • Vaccine counseling   • Positive depression screening   • Genetic testing   • Obesity affecting pregnancy, antepartum   • Anemia affecting pregnancy in third trimester     Plan  To call with vaginal bleeding, loss of fluid, regular contractions, decreased fetal movement, other needs or concerns  Return in 2 weeks  Pt verbalized understanding of all discussed      "

## 2023-04-27 NOTE — PATIENT INSTRUCTIONS
LABOR PRECAUTIONS  Call our office at [phone] for any of the following:    * I need to call immediately I if I have even a small amount of LIQUID  leaking from my vagina, with or without contractions  * I need to call if I am BLEEDING an amount equal to or more than a period  A small amount of bloody vaginal discharge is normal at the end of the pregnancy  * I need to call if I am having CONTRACTIONS  every five minutes for at least an hour  I will need a watch in order to time them  I should time them from the beginning of one contraction until the beginning of the next one  * I need to call BEFORE  I go to the hospital    * I need to have a plan for TRANSPORTATION  to get to the hospital when I am in labor  Labor is generally not an emergency which requires an ambulance  FETAL KICK COUNTS    In the third trimester (after 28 weeks gestation) you should be performing fetal kick counts every day  Your baby should move at least 10 times in 2 hours during an active time, once a day  Choose atime of day when your baby is most active  Try to do this around the same time each day  Get into a comfortable position and then write down the time your baby first moves  Count each movement until the baby moves 10 times  These movements include kicks, punches, nudges, flutters, or rolls  This can take anywhere from 5 minutes to 2 hours  Write down the time you feel the baby's 10th movement  If 2 hours has passed and your baby has not moved at least 10 times, you should CALL THE OFFICE RIGHT AWAY  [phone]        PERINEAL / VAGINAL MASSAGE    What can I do now to decrease my chances of tearing during delivery? Massaging around the vaginal opening by you (or your partner), either antepartum (before birth) or during the second stage of labor, can reduce the likelihood of perineal tearing during childbirth    Likewise, the use of warm packs held on the perineum during the pushing stage of labor can reduce the severity of your tear  This will happen during the pushing stage of labor  At home, you can also help reduce the chances of injury that may occur during the birth of your child through perineal massage  When should I do this? Starting around or shortly after 34 weeks of pregnancy, you or your partner should provide 5-10 minutes of vaginal massage 1-4 times per week  How? Use either almond, coconut, or olive oil and water mixture on 1 or 2 fingers (depending on comfort)  Insert finger(s) 3-5cm into the vagina  Apply sweeping downward/sideward pressure from 3 to 9 o'clock for 5-10 minutes, 1-4 times per week  GROUP B STREP    Group B Strep (GBS) is a common vaginal bacteria  Approximately 25% of women normally have GBS bacteria present in the vagina  It is NOT a sexually-transmitted infection  In fact, it is not an infection AT ALL! It is just a normal vaginal bacteria for many women  However, the GBS bacteria can be dangerous to a  baby if the baby is exposed to that particular bacteria during labor and birth AND develops an infection from it  The likelihood of a  GBS infection for a woman who has GBS bacteria in the vagina is about 1%-2%  But if it does occur, a baby could become severely ill  For this reason, we do a vaginal culture (Q-tip swab of the vagina and rectum) for ALL pregnant women at approximately 36 weeks of pregnancy  If the culture shows that there is GBS bacteria present, it is NOT a reason to panic! Because in this situation we will give this woman antibiotics through her IV while she is in labor  When a mother is treated with antibiotics during labor and delivery, her baby ALMOST NEVER becomes infected with GBS bacteria  Coronavirus (CANGN-72) pregnancy FAQs: Medical experts answer your questions  From [employer] cy  com/0_coronavirus-covid-19-pregnancy-faq-medical-lwqkpin-wptyra-pt_98407328  bc (courtesy of [facility])  As of 3/18/20  By Darryl Mcdonough 37  Medically reviewed by Society for Maternal-Fetal Medicine       We asked parents-to-be to send us their most pressing questions about coronavirus (COVID-19)  Among them: Is it still safe to deliver in a hospital? What if my ob-gyn has the virus? We sent those questions to the top docs at the Society for Maternal-Fetal Medicine  Here are their answers  The coronavirus (COVID-19) pandemic has been declared a national emergency in the [facility] by [employer]  Moms-to-be like you are concerned about everything from getting medicines to managing disruptions at work  But above and beyond any worry about lifestyle changes is a focus on your health and the impact of COVID-19 on your pregnancy  We asked obstetrics doctors who handle the most complicated pregnancy issues to answer your questions about the coronavirus  Here are their responses, provided by Dr Arlene Mallory and her colleagues at the Society for [place] [employer]  Am I at more risk for COVID-19 if I'm pregnant? We don't know  Pregnancy does change your immune system in ways that might make you more susceptible to viral respiratory infections like COVID-19  If you become infected, you might also be at higher risk for more severe illness compared to the general population  Although this does not appear to be the case with COVID-19, based on limited data so far, a higher risk has been true for pregnant women with other coronavirus infections (SARS-CoV and MERS-CoV) and other viral respiratory infections, such as flu  It's important to take preventive actions to avoid infection, such as washing your hands often and avoiding people who are sick  How might coronavirus affect my pregnancy? Again, we don't know  Women with other coronavirus infections (such as SARS-CoV) did not have miscarriage or stillbirth at higher rates than the general population    We know that having other respiratory viral infections during pregnancy, such as flu, has been associated with problems like low birth weight and  birth  Also, having a high fever early in pregnancy may increase the risk of certain birth defects  Could I transmit coronavirus to my baby during pregnancy or delivery? We don't know whether you could transmit COVID-19 to your baby before or during delivery  However, among the few case studies of infants born to mothers with COVID-23 published in peer-reviewed literature, none of the infants tested positive for the virus  Also, there was no virus detected in samples of amniotic fluid or breast milk  There have been a few reports of newborns as young as a few days old with infection, suggesting that a mother can transmit the infection to her infant through close contact after delivery  There have been no reports of mother-to-baby transmission for other coronaviruses (MERS-CoV and SARS-CoV), although only limited information is available  Is it safe for me to deliver at a hospital where there have been COVID-19 cases? Yes  We know that COVID-19 is a very scary virus  The good news is that hospitals are taking great precautions to keep patients and healthcare providers safe  When a patient is even suspected to have COVID-19, they are placed in a negative pressure room  (Think of these rooms as vacuums that suck and filter the air so it's safe for the other people in the hospital)  This makes it possible for you to deliver at the hospital without putting you or your baby at risk  Hospitals are also implementing stricter visiting policies to keep patients safe  It's worth calling your hospital to check if there are any new regulations to be aware of  What plans should I make now in case the hospital system is overwhelmed when it's time for me to deliver? This is a great question to talk with your doctor or midwife about when you're 34 to 36 weeks pregnant   Every hospital is making different plans for dealing with this scenario  I work in healthcare  Should I ask my doctor to excuse me from work until the baby is born? What if I work in a school, the travel [employer], or some other high-risk setting? Healthcare facilities should take care to limit the exposure of pregnant employees to patients with confirmed or suspected COVID-19, just as they would with other infectious cases  If you continue working, be sure to follow the CDC's risk assessment and infection control guidelines  If you work in a school, travel [employer], or other high-risk setting, talk with your employer about what it's doing to protect employees and minimize infection risks  Wash your hands often  What if my OB gets COVID-19? If your doctor or midwife tests positive for COVID-19, they will need to be quarantined until they recover and are no longer at risk of transmitting the virus  In this case, you'll be assigned to another OB in your doctor's practice (or you may choose another practitioner yourself)  Ask your new OB or your doctor's office if you should self-quarantine or be tested for the virus  (It will depend on when you last saw your provider and when that person tested positive )    Should we hold off on trying to conceive because of COVID-19? At this time, there's no reason to hold off on trying to get pregnant, but the data we have is really limited  For example, we don't think the virus causes birth defects or increases your risk of miscarriage  But we don't know whether you could transmit COVID-19 to your baby before or during delivery  We also don't know if the virus lives in semen or can be sexually transmitted  We have a babymoon scheduled in the next few months - should we cancel? If you're planning to travel internationally or on a cruise, you should strongly consider canceling   At this time, the virus has reached more than 140 countries, and there are travel bans to [place], most of [place], and [place]  Places where large numbers of people gather are at highest risk, especially airports and cruise ships  If you're planning travel in the [place] , note that any travel setting increases your risk of exposure, and there may be travel bans even in [place] by the time you're scheduled to go  Even if you're state is not blocked, you'll definitely want to avoid traveling to communities where the virus is spreading  To find out where these places are, check The [place] Times map based on CDC data  For the most current advice to help you avoid exposure, check the CDC's COVID-19 travel page  Will the hospital separate me from my  and keep the baby in quarantine? If you test positive for COVID-19 or have been exposed but have no symptoms, the CDC, [employer] of Obstetricians and Gynecologists, and the Society for [place] [employer] all recommend that you be  from your baby to decrease the risk of transmission to the baby  This would last until you are no longer at risk of transmitting the virus  If you do not have COVID-19 and have not been exposed to the virus, the hospital will not separate you from your   My hospital is restricting visitors and only allowing one support person  If my support person leaves after the delivery, will they be allowed to come back? Every hospital has different policies  Contact your hospital or labor and delivery unit a week or so before delivery to get the most up-to-date restrictions  In general, if your support person needs to leave, they would be allowed back unless they knew they were exposed to COVID-19 after leaving your company  [facility] understands that the coronavirus (COVID-19) pandemic is an evolving story and that your questions will change over time  We'll continue asking moms and dads in our Community what they want to know, and we'll get the answers from experts to keep them - and you - informed and supported      My mom was planning to fly here to help me care for my new baby after delivery  Should I tell her not to come? Yes  If your mom is over 61 or has any serious chronic medical conditions (such as heart disease, lung disease, or diabetes), she is at higher risk of serious illness from COVID-19 and should avoid air travel  And remember that any travel setting increases a person's risk of exposure  So, it may be risky to have her around the baby after she has been traveling  For the most current advice on traveling, check the [facility]'s COVID-19 travel page  [facility] understands that the coronavirus pandemic is an evolving story and that your questions will change over time  We'll continue asking moms and dads in our Community what they want to know, and we'll get the answers from experts to keep them - and you - informed and supported       Return in 2 weeks

## 2023-05-05 ENCOUNTER — ULTRASOUND (OUTPATIENT)
Dept: PERINATAL CARE | Facility: OTHER | Age: 18
End: 2023-05-05

## 2023-05-05 VITALS
HEIGHT: 62 IN | BODY MASS INDEX: 33.82 KG/M2 | DIASTOLIC BLOOD PRESSURE: 58 MMHG | HEART RATE: 88 BPM | WEIGHT: 183.8 LBS | SYSTOLIC BLOOD PRESSURE: 112 MMHG

## 2023-05-05 DIAGNOSIS — Z36.2 ENCOUNTER FOR FOLLOW-UP ULTRASOUND OF FETAL ANATOMY: ICD-10-CM

## 2023-05-05 DIAGNOSIS — Z3A.32 32 WEEKS GESTATION OF PREGNANCY: ICD-10-CM

## 2023-05-05 DIAGNOSIS — Z36.89 ENCOUNTER FOR ULTRASOUND TO CHECK FETAL GROWTH: Primary | ICD-10-CM

## 2023-05-05 DIAGNOSIS — O99.210 OBESITY AFFECTING PREGNANCY, ANTEPARTUM: ICD-10-CM

## 2023-05-05 NOTE — PROGRESS NOTES
"[address]: Ms Iglesia Stark was seen today for fetal growth and followup missed anatomy ultrasound  See ultrasound report under \"OB Procedures\" tab  Please don't hesitate to contact our office with any concerns or questions    -Marie Vanessa MD      "

## 2023-05-11 ENCOUNTER — ROUTINE PRENATAL (OUTPATIENT)
Dept: OBGYN CLINIC | Facility: CLINIC | Age: 18
End: 2023-05-11

## 2023-05-11 VITALS
BODY MASS INDEX: 34.01 KG/M2 | WEIGHT: 184.8 LBS | HEART RATE: 89 BPM | HEIGHT: 62 IN | SYSTOLIC BLOOD PRESSURE: 112 MMHG | DIASTOLIC BLOOD PRESSURE: 74 MMHG

## 2023-05-11 DIAGNOSIS — Z3A.32 32 WEEKS GESTATION OF PREGNANCY: Primary | ICD-10-CM

## 2023-05-11 DIAGNOSIS — O99.013 ANEMIA AFFECTING PREGNANCY IN THIRD TRIMESTER: ICD-10-CM

## 2023-05-11 DIAGNOSIS — E66.9 OBESITY (BMI 30.0-34.9): ICD-10-CM

## 2023-05-11 PROBLEM — O99.210 OBESITY AFFECTING PREGNANCY, ANTEPARTUM: Status: RESOLVED | Noted: 2023-02-10 | Resolved: 2023-05-11

## 2023-05-11 PROCEDURE — 87491 CHLMYD TRACH DNA AMP PROBE: CPT | Performed by: OBSTETRICS & GYNECOLOGY

## 2023-05-11 PROCEDURE — 87591 N.GONORRHOEAE DNA AMP PROB: CPT | Performed by: OBSTETRICS & GYNECOLOGY

## 2023-05-11 NOTE — PROGRESS NOTES
OB/GYN  PN Visit  Yoanna Paige  [id number]  2023  11:36 AM  Dr Cindy Garnica MD    S: 21 y o  Carlos Mckeon here for PN visit  She has  obstetric complaints, including pelvic pain, contractions, vaginal bleeding, loss of fluid, or decreased fetal movement  O:  Vitals:    23 1121   BP: 112/74   Pulse: 89         Physical examination   Cardio-pulmonar  Normal vesicular murmur, no rales,  nonlabored breathing , normal S1/S2 no cardiac murmurs , no gallops   Abdomen  Soft , no tender, no signs or peritoneal irritation   Extremities  mobiles no edemas  Fundal height: 32  FHR 140s    A/P:    Problem List Items Addressed This Visit    None      D 21 y o  Carlos Mckeon  here for prenatal care without obstetric complaints today  In this visit we addressed:    Problem List        Other    32 weeks gestation of pregnancy    Overview     · Prenatal panel 22 HIV negative, RPR negative, Rubella immune, hep B, C negative  · CL/GC no collected during this visit, specimen collected today  · NIPTscreen negative, Fragile X, SMD, CF neg  · Last US 23 Vertex, EFW 1950(50%), placenta posterior   · 28 weeks labs wnl  · Tdap vaccine provided 23  · We signed delivery consent today  · Birth plan: , she would like Epidural in her labor  Breastfeeding: yes  · Contraception: Nexplanon  Discussed  labor precautions and fetal kick counts    · Return to clinic in 2 weeks         Obesity (BMI 30 0-34 9)    Overview     • Complications associated  with increase BMI  were discussed with patient as hypertensive disorders of pregnancy, gestational diabetes, fetal demise,  birth, fetal macrosomia  serious cardiac, respiratory and hematologic conditions, thromboembolic events and anesthesia related complications             Vaccine counseling    Positive depression screening    Overview     : PHQ-9 = 12  Referral placed to social work         Genetic testing    Overview     NIPT normal         Anemia affecting pregnancy in third trimester    Overview     Lab Results   Component Value Date    HGB 10 5 (L) 03/23/2023   on PO iron supplementation                   Patient d/with Dr Leon Harirson MD  5/11/2023  11:36 AM

## 2023-05-15 LAB
C TRACH DNA SPEC QL NAA+PROBE: NEGATIVE
N GONORRHOEA DNA SPEC QL NAA+PROBE: NEGATIVE

## 2023-05-25 ENCOUNTER — ROUTINE PRENATAL (OUTPATIENT)
Dept: OBGYN CLINIC | Facility: CLINIC | Age: 18
End: 2023-05-25

## 2023-05-25 VITALS
DIASTOLIC BLOOD PRESSURE: 61 MMHG | WEIGHT: 183.4 LBS | SYSTOLIC BLOOD PRESSURE: 119 MMHG | BODY MASS INDEX: 33.54 KG/M2 | HEART RATE: 82 BPM

## 2023-05-25 DIAGNOSIS — Z3A.34 34 WEEKS GESTATION OF PREGNANCY: ICD-10-CM

## 2023-05-25 DIAGNOSIS — D50.9 MATERNAL IRON DEFICIENCY ANEMIA COMPLICATING PREGNANCY IN THIRD TRIMESTER: ICD-10-CM

## 2023-05-25 DIAGNOSIS — O99.013 MATERNAL IRON DEFICIENCY ANEMIA COMPLICATING PREGNANCY IN THIRD TRIMESTER: ICD-10-CM

## 2023-05-25 DIAGNOSIS — Z34.93 PRENATAL CARE IN THIRD TRIMESTER: Primary | ICD-10-CM

## 2023-05-25 RX ORDER — FERROUS SULFATE TAB EC 324 MG (65 MG FE EQUIVALENT) 324 (65 FE) MG
324 TABLET DELAYED RESPONSE ORAL
Qty: 60 TABLET | Refills: 3 | Status: SHIPPED | OUTPATIENT
Start: 2023-05-25 | End: 2023-08-23

## 2023-05-25 NOTE — PROGRESS NOTES
Rosetta Calle presents today for routine OB visit at 34w6d  Blood Pressure: 119/61  Wt=83 2 kg (183 lb 6 4 oz); Body mass index is 33 54 kg/m² ; TWG=4 264 kg (9 lb 6 4 oz)  Fetal Heart Rate: 148; Fundal Height (cm): 35 cm  Abdomen: gravid, soft, non-tender  She reports backache  Denies uterine contractions  Denies vaginal bleeding or leaking of fluid  Reports adequate fetal movement of at least 10 movements in 2 hours once daily  Reviewed premature labor precautions and fetal kick counts  Advised to continue medications and return in 1 week        Current Outpatient Medications   Medication Instructions   • ferrous sulfate 324 mg, Oral, 2 times daily before meals   • Prenatal Vit-Fe Fumarate-FA (Prenatal Complete) 14-0 4 MG TABS 1 tablet, Oral, Daily

## 2023-05-25 NOTE — PATIENT INSTRUCTIONS
Vitaliy por hoyos confianza en nuestro equipo  Allyn Cheyenne y agradecemos lisa comentarios  Si recibe meagan encuesta nuestra, tómese unos momentos para informarnos cómo estamos  CRYSTAL Mcguire       El Tercer Trimestre  (28-42 semanas)   HOYOS BEBÉ   ·        hoyos bebé chupa hoyos dedo ahora! ·        hoyos bebé puede oír voces y responder al tacto    habla con Andra Bumpers!!   ·        el cerebro de hoyos bebé crece y se desarrolla más en los últimos 2 meses de embarazo   ·        [place] y [place] del bebé son Neha Fret y flexibles para que quepan por el canal del parto   ·        los movimientos del bebé cambian hacia el final del embarazo porque hay menos espacio para patear y estiramientos en tu vientre   ·        los pulmones del bebé no están completamente desarrollados y totalmente preparados para respirar por lisa propios hasta el último 3-4 semanas antes de hoyos fecha de vencimiento     TU CUERPO   ·        hoyos vientre está creciendo mucho ahora   ·        será más difícil dormir reggei de noche o ser tan activos reyes eres generalmente   ·        puede sudar más de lo habitual   ·        serás más desequilibrado    tenga cuidado de no caer! ·        Usted puede desarrollar hemorroides (qué pueden ser dolorosas y hacen difícil sentarse)   ·        los dos últimos meses del embarazo puede ser muy incómodos con opal de [place], opal de Teran Bob y ardor de estómago   ·        Puedes empezar a tener contracciones    mientras son irregulares y Ai de 5 por hora, esto es meagan parte normal de hoyos cuerpo a punto de tener un bebé   ·        el ramin uterino puede empezar a dilatar y abrir [place]    para estar listo para el parto   ·        Usted puede encontrarse que necesitan hacer orinar muy a menudo    porque el bebé está presionando mucho la vejiga   ·        Usted puede quedarse corta de respiracion más rápido de lo omero          CUENTAS DEL RETROCESO FETAL    En el tercer trimestre (después de 28 semanas de gestación) deben realizar patada fetal cuentas cada día  Rojas bebé debe moverse al menos 10 veces en 2 horas laz un tiempo [place], meagan vez al día  Elegir el atime del día cuando el bebé es [place]  Trate de hacerlo a la misma hora cada día  Entrar en meagan posición cómoda y luego escribir el tiempo que tu bebé se mueve jeelna  Cuenta cada movimiento hasta que el bebé se mueve 10 veces  Estos movimientos incluyen patadas, puñetazos, codazos, revolotea o rollos  [place] puede tardar entre 5 minutos a 2 horas  Anote el tiempo que sientes movimiento 10 del bebé  Si ha pasado 2 horas y tu bebé no se ha movido al menos 10 veces, usted debe llamar a la oficina de inmediato  Kathrin Lupis Erickson [address] a [phone]:  Vernida Buster que llamar inmediatamente si tengo incluso meagan pequeña cantidad de LIQUIDO de mi vagina, con o sin contracciones  * Tengo que llamar si estoy SANGRADO de mi vagina  * Tengo que llamar si tengo COLICOS que continúa después de beber 2 ó 3 vasos de agua y [place] en mi lado laz meagan hora y que se siente reyes que estoy teniendo un período  * Tengo que llamar si siento CONTRACCIONES más de 4 veces en meagan hora quando siento que mi darnell se mantiene dura después de que trato de beber 2-3 vasos del agua y [place] en mi lado laz meagan hora  * Tengo que llamar si shawna un cambio de mi FLUJO vaginal    * Tengo que llamar si siento la PRESIÓN PÉLVICA que siente que el bebé aprieta en mi vagina y dura más de [place]  * Tengo que llamar si tengo DOLOR BAJO DE LA ESPALDA que es nueva y está cerca de mi cóccix  Puede entrar y salir varias veces laz meagan hora o quedarse allí constantemente  LA PRE-ECLAMPSIA    ¿Qué es? La preeclampsia es meagan enfermedad grave que puede ocurrir laz el embarazo se relaciona con la presión arterial kellie  Le puede pasar a cualquier marlon  ¿Por qué Yes importarme?  [address] preeclampsia tienen riesgos graves pueden incluir convulsiones, trazo, daños [employer], nacimiento prematuro de hoyos bebé  En los [facility], puede causar la muerte de la madre y hoyos bebé  ¿Qué solnage pagar Aey Deaner? Signos y síntomas de la preeclampsia pueden incluir:   * Inflamación severa jameel de la micah o las   * Dolor de ho todavía presente después de andrea tomado Tylenol   * Marco manchas o cambios en [place]   * Dolor en la parte superior del abdomen o hombro   * Dawson náuseas y vómitos (en la segunda mitad del embarazo)   * Aumento de peso repentino   * Dificultad para respirar     ¿Qué solange hacer? Si usted experimenta alguno de los síntomas de la preeclampsia, comuníquese con hoyos proveedor de [place]  Encontrar la preeclampsia temprana es importante para usted y hoyos bebé  Julianna peter [phone]            LACTANCIA MATERNA     BENEFICIOS PARA LOS BEBÉS   ·       sistemas inmunológicos más radha (menos alergias, eczema, asma y cáncer infantil)   ·       [de-identified] diarrea y estreñimiento u otras enfermedades GI   ·       menos resfriados e infecciones del oído   ·       mejor visión y dientes (menos cavidades)   ·       mejora el IQ   ·       menores tasas de diabetes y obesidad en la infancia     BENEFICIOS PARA LAS MADRES   ·        promueve la pérdida de peso más rápida después del parto   ·        nathan riesgo para la depresión postparto   ·        nathan riesgo para los cánceres Cesar, útero y ovario   ·        nathan riesgo para la osteoporosis en desarrollo con la edad   ·        siempre es más fácil que fórmula - correcto, limpio, y la temperatura adecuada   ·        menos costosa que la fórmula es gratis!!!     CLAVES PARA PAMELA LACTANCIA EXITOSA   ·        mantener bebé piel a piel hasta después de la primera alimentación evento   ·        tener a bebé en hoyos cuarto con usted laz hoyos estadía en el hospital después del parto   ·        evitar cualquier botella de alimentación (a menos que sea médicamente necesario)   ·        limitar el uso de chupones y pañales   ·        Pida ayuda si usted está teniendo problemas    consultores de lactancia (que se especializan en la lactancia) están disponibles para ayudarle a   ·        meagan dieta saludable para mamá    comiendo meagan variedad de alimentos y raciones con moderación     COSAS QUE DEBES SABER SOBRE LA LACTANCIA   ·        mayoría de los medicamentos se considera compatible con la lactancia materna por [address] [place] [address] consultarlo con hoyos médico o en lactancia antes de sharmin un medicamento nuevo    para estar seguro es seguro   ·        alcohol (cerveza, vino, licor) puede transmitirse de la madre al bebé a través de la Jessica    un ocasional, social bebida es considerado aceptable por Eamon 24    más que eso deben evitarse   ·        la lactancia materna es NO es un método para evitar embarazo   ·        nicotina (cigarrillos) puede pasar de la madre al bebé a través de la Jessica    sin embargo, para las [employer], es aún más saludable para amamantar que use la fórmula   ·        cafeína debería limitarse a 1-3 tazas por día    incluye café, refrescos, bebidas energéticas           MASAJE EN LA RAMONE PERINEAL O VAGINAL    Que puedo hacer ahora para reducir las probabilidades de desgarro laz el parto? Masaje alrededor del orificio de la vagina realizado por usted misma (o por hoyos osman)  El masaje en esta ramone, ya sea antes del parto o laz la segunda etapa del parto, [place] reducir la probabilidad de desgarro perineal laz el parto  Asimismo, el uso de compresas tibias en el perineo laz la etapa expulsiva del parto puede reducir la pravedad del desparro  [place] sucedera laz la etapa expulsiva del parto  En casa tambien puede reducir las probabilidades de sufrir lesiones durnate el parto de con masajes en la ramone perineal     Amy Hof?   [address] embarazadas a partir de, [address] [place], las 34 semanas de Felicitas  Cuando? Usted o hoyos osman deben hacer masajes en la addie vaginal iraida 5 a 10 minutos de 1 a 4 veces por semana  [place]? Use meagan mezcla de agua y aceite de [place], braxton u bunch con 1 o 2 dedos (angeli la comodidad)  Inserte los dedos en la vagina entre 3 y 5cm  Aplique presion general hacia abajo y [employer] costados iraida 5 a [address] 3 y las 9 horas, de 1 a 4 veces por semana  SENALES IRAIDA EL EMBARAZO  Llame a nuestra oficina al [phone] para cualquiera de los siguientes:    1  Sangrado vaginal  2  Dolor abdominal gwen que no desaparece  3  Fiebre (más de 100 4 y no se jermanie con Tylenol)  4  Vómitos persistentes que nino más de 24 horas  5  dolor de pecho  6  Dolor o ardor al orinar  7  Dolor de ho severo que no se resuelve con Tylenol  8  Visión borrosa o luisito puntos en hoyos visión  9  Hinchazón repentina de hoyos micah o jameel  10  Enrojecimiento, hinchazón o dolor en meagan pierna  11  Un aumento de peso repentino en pocos días  12  Contar los movimientos fetales del bebé  (después de 28 semanas o el sexto mes de embarazo)  15  Meagan pérdida de líquido acuoso de la vagina: puede ser un chorro, un goteo o meagan humedad continua  14  Después de 20 semanas de embarazo, calambres rítmicos (más de 4 por hora) o menstruales reyes dolor bajo / Enrico [place] IRAIDA EL EMBARAZO    TDAP  La tos ferina (o tos [place]) puede ser grave para cualquier persona, isaiah para hoyos recién nacido, puede ser mortal  Hasta 20 recién nacidos mueren cada año en los [place] debido a la tos [place]   Aproximadamente la mitad de los bebés menores de 1 año de edad que contraen tos ferina necesitan tratamiento en el hospital  Cuanto más joven es el bebé cuando él o sherita son la tos [place] probable es que él o sherita tendrá que ser tratado en un hospital  Cuando usted recibe la vacuna contra la tos ferina (Tdap) laz hoyos embarazo, hoyos cuerpo creará anticuerpos protectores y algunos de ellos pasan a hoyos bebé antes de nacer  Estos anticuerpos pueden ayudar a proteger a hoyos bebé contraigan la tos ferina hasta que tienen edad suficiente para recibir la vacuna ellos mismos (generalmente alrededor de 6 meses de edad)  INFLUENZA  Cambios en las funciones inmunológica, del corazón y pulmón laz el embarazo te hacen más susceptible a padecer seriamente enfermo de la gripe  Coger la gripe también aumenta las posibilidades de problemas graves para hoyos bebé en desarrollo, incluyendo la entrega y el trabajo de [place]  Se recomienda que todas las mujeres que están embarazadas laz la temporada de gripe deben recibir meagan vacuna contra la influenza

## 2023-06-01 ENCOUNTER — ROUTINE PRENATAL (OUTPATIENT)
Dept: OBGYN CLINIC | Facility: CLINIC | Age: 18
End: 2023-06-01

## 2023-06-01 VITALS
HEART RATE: 83 BPM | SYSTOLIC BLOOD PRESSURE: 119 MMHG | HEIGHT: 62 IN | DIASTOLIC BLOOD PRESSURE: 74 MMHG | WEIGHT: 185.8 LBS | BODY MASS INDEX: 34.19 KG/M2

## 2023-06-01 DIAGNOSIS — Z3A.35 35 WEEKS GESTATION OF PREGNANCY: Primary | ICD-10-CM

## 2023-06-01 DIAGNOSIS — O99.013 ANEMIA AFFECTING PREGNANCY IN THIRD TRIMESTER: ICD-10-CM

## 2023-06-01 DIAGNOSIS — E66.9 OBESITY (BMI 30.0-34.9): ICD-10-CM

## 2023-06-01 PROCEDURE — 87150 DNA/RNA AMPLIFIED PROBE: CPT

## 2023-06-01 NOTE — PROGRESS NOTES
[address]  [id number]  2005        A/P:  Problem List Items Addressed This Visit        Other    35 weeks gestation of pregnancy - Primary     · Prenatal panel 22 HIV negative, RPR negative, Rubella immune, hep B, C negative  · CL/GC no collected during this visit, specimen collected today  · NIPTscreen negative, Fragile X, SMD, CF neg  · Last US 23 Vertex, EFW 1950(50%), placenta posterior   · 28 weeks labs wnl  · Tdap vaccine provided 23  · Delivery consent signed 23  · Birth plan: , plans for epidural   Breastfeeding: yes  · Contraception: Nexplanon  Discussed  labor precautions and fetal kick counts    · Return to clinic in 1 week          Relevant Orders    Strep B DNA probe, amplification    Obesity (BMI 30 0-34  9)    Anemia affecting pregnancy in third trimester     Most recent Hgb 10 5   Continue Oral fe supplementation                 S: 21 y o  Jaz Lawrence 35w6d here for PN visit  She denies contractions  She denies leakage of fluid and vaginal bleeding  She reports good fetal movement       O:  Vitals:    23 1106   BP: 119/74   Pulse: 83     Physical Exam    Fetal Heart Rate: 141  Fundal Height (cm): 35 cm    D/w Dr Denny Melton MD  Obstetrics & Gynecology PGY-2

## 2023-06-03 NOTE — ASSESSMENT & PLAN NOTE
· Prenatal panel 22 HIV negative, RPR negative, Rubella immune, hep B, C negative     · CL/GC no collected during this visit, specimen collected today  · NIPTscreen negative, Fragile X, SMD, CF neg  · Last US 23 Vertex, EFW 1950(50%), placenta posterior   · 28 weeks labs wnl  · Tdap vaccine provided 23  · Delivery consent signed 23  · Birth plan: , plans for epidural   Breastfeeding: yes  · Contraception: Nexplanon  Discussed  labor precautions and fetal kick counts    · Return to clinic in 1 week

## 2023-06-05 LAB — GP B STREP DNA SPEC QL NAA+PROBE: POSITIVE

## 2023-06-08 ENCOUNTER — ROUTINE PRENATAL (OUTPATIENT)
Dept: OBGYN CLINIC | Facility: CLINIC | Age: 18
End: 2023-06-08

## 2023-06-08 VITALS
DIASTOLIC BLOOD PRESSURE: 73 MMHG | HEART RATE: 88 BPM | SYSTOLIC BLOOD PRESSURE: 130 MMHG | WEIGHT: 183.2 LBS | BODY MASS INDEX: 33.51 KG/M2

## 2023-06-08 DIAGNOSIS — B95.1 POSITIVE GBS TEST: ICD-10-CM

## 2023-06-08 DIAGNOSIS — Z34.03 ENCOUNTER FOR SUPERVISION OF NORMAL FIRST PREGNANCY IN THIRD TRIMESTER: Primary | ICD-10-CM

## 2023-06-08 PROBLEM — Z3A.37 37 WEEKS GESTATION OF PREGNANCY: Status: ACTIVE | Noted: 2022-12-28

## 2023-06-08 PROCEDURE — 99215 OFFICE O/P EST HI 40 MIN: CPT | Performed by: NURSE PRACTITIONER

## 2023-06-08 NOTE — PATIENT INSTRUCTIONS
LABOR PRECAUTIONS  Call our office at [phone] for any of the following:    * I need to call immediately I if I have even a small amount of LIQUID  leaking from my vagina, with or without contractions  * I need to call if I am BLEEDING an amount equal to or more than a period  A small amount of bloody vaginal discharge is normal at the end of the pregnancy  * I need to call if I am having CONTRACTIONS  every five minutes for at least an hour  I will need a watch in order to time them  I should time them from the beginning of one contraction until the beginning of the next one  * I need to call BEFORE  I go to the hospital    * I need to have a plan for TRANSPORTATION  to get to the hospital when I am in labor  Labor is generally not an emergency which requires an ambulance  FETAL KICK COUNTS    In the third trimester (after 28 weeks gestation) you should be performing fetal kick counts every day  Your baby should move at least 10 times in 2 hours during an active time, once a day  Choose atime of day when your baby is most active  Try to do this around the same time each day  Get into a comfortable position and then write down the time your baby first moves  Count each movement until the baby moves 10 times  These movements include kicks, punches, nudges, flutters, or rolls  This can take anywhere from 5 minutes to 2 hours  Write down the time you feel the baby's 10th movement  If 2 hours has passed and your baby has not moved at least 10 times, you should CALL THE OFFICE RIGHT AWAY  [phone]        PERINEAL / VAGINAL MASSAGE    What can I do now to decrease my chances of tearing during delivery? Massaging around the vaginal opening by you (or your partner), either antepartum (before birth) or during the second stage of labor, can reduce the likelihood of perineal tearing during childbirth    Likewise, the use of warm packs held on the perineum during the pushing stage of labor can reduce the severity of your tear  This will happen during the pushing stage of labor  At home, you can also help reduce the chances of injury that may occur during the birth of your child through perineal massage  When should I do this? Starting around or shortly after 34 weeks of pregnancy, you or your partner should provide 5-10 minutes of vaginal massage 1-4 times per week  How? Use either almond, coconut, or olive oil and water mixture on 1 or 2 fingers (depending on comfort)  Insert finger(s) 3-5cm into the vagina  Apply sweeping downward/sideward pressure from 3 to 9 o'clock for 5-10 minutes, 1-4 times per week  GROUP B STREP    Group B Strep (GBS) is a common vaginal bacteria  Approximately 25% of women normally have GBS bacteria present in the vagina  It is NOT a sexually-transmitted infection  In fact, it is not an infection AT ALL! It is just a normal vaginal bacteria for many women  However, the GBS bacteria can be dangerous to a  baby if the baby is exposed to that particular bacteria during labor and birth AND develops an infection from it  The likelihood of a  GBS infection for a woman who has GBS bacteria in the vagina is about 1%-2%  But if it does occur, a baby could become severely ill  For this reason, we do a vaginal culture (Q-tip swab of the vagina and rectum) for ALL pregnant women at approximately 36 weeks of pregnancy  If the culture shows that there is GBS bacteria present, it is NOT a reason to panic! Because in this situation we will give this woman antibiotics through her IV while she is in labor  When a mother is treated with antibiotics during labor and delivery, her baby ALMOST NEVER becomes infected with GBS bacteria  Coronavirus (IFRZR-86) pregnancy FAQs: Medical experts answer your questions  From [employer] cy  com/0_coronavirus-covid-19-pregnancy-faq-medical-jhimbvq-dcqzhg-oy_[email or web address]  bc (courtesy of [facility])  As of 3/18/20  By Darryl Mckeon 39  Medically reviewed by Society for Maternal-Fetal Medicine       We asked parents-to-be to send us their most pressing questions about coronavirus (COVID-19)  Among them: Is it still safe to deliver in a hospital? What if my ob-gyn has the virus? We sent those questions to the top docs at the Society for Maternal-Fetal Medicine  Here are their answers  The coronavirus (COVID-19) pandemic has been declared a national emergency in the [facility] by [employer]  Moms-to-be like you are concerned about everything from getting medicines to managing disruptions at work  But above and beyond any worry about lifestyle changes is a focus on your health and the impact of COVID-19 on your pregnancy  We asked obstetrics doctors who handle the most complicated pregnancy issues to answer your questions about the coronavirus  Here are their responses, provided by Dr Swetha Desai and her colleagues at the Society for [place] [employer]  Am I at more risk for COVID-19 if I'm pregnant? We don't know  Pregnancy does change your immune system in ways that might make you more susceptible to viral respiratory infections like COVID-19  If you become infected, you might also be at higher risk for more severe illness compared to the general population  Although this does not appear to be the case with COVID-19, based on limited data so far, a higher risk has been true for pregnant women with other coronavirus infections (SARS-CoV and MERS-CoV) and other viral respiratory infections, such as flu  It's important to take preventive actions to avoid infection, such as washing your hands often and avoiding people who are sick  How might coronavirus affect my pregnancy? Again, we don't know  Women with other coronavirus infections (such as SARS-CoV) did not have miscarriage or stillbirth at higher rates than the general population    We know that having other respiratory viral infections during pregnancy, such as flu, has been associated with problems like low birth weight and  birth  Also, having a high fever early in pregnancy may increase the risk of certain birth defects  Could I transmit coronavirus to my baby during pregnancy or delivery? We don't know whether you could transmit COVID-19 to your baby before or during delivery  However, among the few case studies of infants born to mothers with COVID-23 published in peer-reviewed literature, none of the infants tested positive for the virus  Also, there was no virus detected in samples of amniotic fluid or breast milk  There have been a few reports of newborns as young as a few days old with infection, suggesting that a mother can transmit the infection to her infant through close contact after delivery  There have been no reports of mother-to-baby transmission for other coronaviruses (MERS-CoV and SARS-CoV), although only limited information is available  Is it safe for me to deliver at a hospital where there have been COVID-19 cases? Yes  We know that COVID-19 is a very scary virus  The good news is that hospitals are taking great precautions to keep patients and healthcare providers safe  When a patient is even suspected to have COVID-19, they are placed in a negative pressure room  (Think of these rooms as vacuums that suck and filter the air so it's safe for the other people in the hospital)  This makes it possible for you to deliver at the hospital without putting you or your baby at risk  Hospitals are also implementing stricter visiting policies to keep patients safe  It's worth calling your hospital to check if there are any new regulations to be aware of  What plans should I make now in case the hospital system is overwhelmed when it's time for me to deliver? This is a great question to talk with your doctor or midwife about when you're 34 to 36 weeks pregnant   Every hospital is making different plans for dealing with this scenario  I work in healthcare  Should I ask my doctor to excuse me from work until the baby is born? What if I work in a school, the travel [employer], or some other high-risk setting? Healthcare facilities should take care to limit the exposure of pregnant employees to patients with confirmed or suspected COVID-19, just as they would with other infectious cases  If you continue working, be sure to follow the CDC's risk assessment and infection control guidelines  If you work in a school, travel [employer], or other high-risk setting, talk with your employer about what it's doing to protect employees and minimize infection risks  Wash your hands often  What if my OB gets COVID-19? If your doctor or midwife tests positive for COVID-19, they will need to be quarantined until they recover and are no longer at risk of transmitting the virus  In this case, you'll be assigned to another OB in your doctor's practice (or you may choose another practitioner yourself)  Ask your new OB or your doctor's office if you should self-quarantine or be tested for the virus  (It will depend on when you last saw your provider and when that person tested positive )    Should we hold off on trying to conceive because of COVID-19? At this time, there's no reason to hold off on trying to get pregnant, but the data we have is really limited  For example, we don't think the virus causes birth defects or increases your risk of miscarriage  But we don't know whether you could transmit COVID-19 to your baby before or during delivery  We also don't know if the virus lives in semen or can be sexually transmitted  We have a babymoon scheduled in the next few months - should we cancel? If you're planning to travel internationally or on a cruise, you should strongly consider canceling   At this time, the virus has reached more than 140 countries, and there are travel bans to [place], most of [place], and [place]  Places where large numbers of people gather are at highest risk, especially airports and cruise ships  If you're planning travel in the [place] , note that any travel setting increases your risk of exposure, and there may be travel bans even in [place] by the time you're scheduled to go  Even if you're state is not blocked, you'll definitely want to avoid traveling to communities where the virus is spreading  To find out where these places are, check The [place] Times map based on CDC data  For the most current advice to help you avoid exposure, check the CDC's COVID-19 travel page  Will the hospital separate me from my  and keep the baby in quarantine? If you test positive for COVID-19 or have been exposed but have no symptoms, the CDC, [address] [place] [address] of Obstetricians and Gynecologists, and the Society for [place] [employer] all recommend that you be  from your baby to decrease the risk of transmission to the baby  This would last until you are no longer at risk of transmitting the virus  If you do not have COVID-19 and have not been exposed to the virus, the hospital will not separate you from your   My hospital is restricting visitors and only allowing one support person  If my support person leaves after the delivery, will they be allowed to come back? Every hospital has different policies  Contact your hospital or labor and delivery unit a week or so before delivery to get the most up-to-date restrictions  In general, if your support person needs to leave, they would be allowed back unless they knew they were exposed to COVID-19 after leaving your company  [facility] understands that the coronavirus (COVID-19) pandemic is an evolving story and that your questions will change over time  We'll continue asking moms and dads in our Community what they want to know, and we'll get the answers from experts to keep them - and you - informed and supported      My mom was planning to fly here to help me care for my new baby after delivery  Should I tell her not to come? Yes  If your mom is over 61 or has any serious chronic medical conditions (such as heart disease, lung disease, or diabetes), she is at higher risk of serious illness from COVID-19 and should avoid air travel  And remember that any travel setting increases a person's risk of exposure  So, it may be risky to have her around the baby after she has been traveling  For the most current advice on traveling, check the [facility]'s COVID-19 travel page  [facility] understands that the coronavirus pandemic is an evolving story and that your questions will change over time  We'll continue asking moms and dads in our Community what they want to know, and we'll get the answers from experts to keep them - and you - informed and supported       Return in 1 week

## 2023-06-08 NOTE — PROGRESS NOTES
Assessment & Plan  21 y o  Jaime Madsen at 36w6d presenting for routine prenatal visit  Explained GBS positive and need for antibiotics in labor     Problem List Items Addressed This Visit        Other    Positive GBS test   Other Visit Diagnoses     Encounter for supervision of normal first pregnancy in third trimester    -  Primary        ____________________________________________________________  Subjective  She is without complaint  She denies contractions, loss of fluid, or vaginal bleeding  She feels regular fetal movements    WNL respiratory effort, negative cough or SOB    Objective  /73   Pulse 88   Wt 83 1 kg (183 lb 3 2 oz)   LMP 09/10/2022 (Exact Date)   BMI 33 51 kg/m²     Fetal Heart Rate: 140    Patient's Active Problem List  Patient Active Problem List   Diagnosis   • 37 weeks gestation of pregnancy   • Obesity (BMI 30 0-34  9)   • Vaccine counseling   • Positive depression screening   • Genetic testing   • Anemia affecting pregnancy in third trimester   • Positive GBS test     Plan  To call with vaginal bleeding, loss of fluid, regular contractions, decreased fetal movement, other needs or concerns  Return in 1 week  Pt verbalized understanding of all discussed

## 2023-06-10 ENCOUNTER — HOSPITAL ENCOUNTER (OUTPATIENT)
Facility: HOSPITAL | Age: 18
Discharge: HOME | End: 2023-06-10
Attending: OBSTETRICS & GYNECOLOGY | Admitting: OBSTETRICS & GYNECOLOGY
Payer: COMMERCIAL

## 2023-06-10 VITALS
RESPIRATION RATE: 20 BRPM | OXYGEN SATURATION: 99 % | HEART RATE: 83 BPM | SYSTOLIC BLOOD PRESSURE: 130 MMHG | TEMPERATURE: 98.6 F | DIASTOLIC BLOOD PRESSURE: 71 MMHG

## 2023-06-10 PROCEDURE — 59025 FETAL NON-STRESS TEST: CPT

## 2023-06-10 PROCEDURE — 99202 OFFICE O/P NEW SF 15 MIN: CPT

## 2023-06-10 PROCEDURE — 99213 OFFICE O/P EST LOW 20 MIN: CPT | Performed by: OBSTETRICS & GYNECOLOGY

## 2023-06-10 PROCEDURE — NC001 PR NO CHARGE: Performed by: OBSTETRICS & GYNECOLOGY

## 2023-06-11 NOTE — PROGRESS NOTES
L&D Triage Note - OB/GYN  Amy Mayo 21 y o  female MRN: [id number]  Unit/Bed#: L&D 329-01 Encounter: [id number]        Patient is seen by [address]    ASSESSMENT/PLAN  Amy Mayo is a 21 y o   at 42w4d who presents for rule out labor  Determined not to be in labor and discharged home with return precautions  1) r/o labor   - reports contractions Q15-30 minutes throughout the evening   - SVE closed       SVE:  Cervical Dilation: Closed  Cervical Effacement: 0  Fetal Station: Ballotable    FHT:  Baseline Rate: 140 bpm  Variability: Moderate 6-25 bpm  Accelerations: 15 x 15 or greater  Decelerations: None    TOCO:   Contraction Frequency (minutes): occasional/irregular  Contraction Duration (seconds): 40-80  Contraction Quality: Mild    2)  Discharge instructions  - Patient instructed to call if experiencing worsening contractions, vaginal bleeding, loss of fluid or decreased fetal movement  - Will follow up with OBGYN in office    D/w Dr Sherrel Primrose  ______________    SUBJECTIVE    TUNDE: Estimated Date of Delivery: 23    HPI:  21 y o   37w1d presents with complaint of contractions  She reports contractions began this evening and are every 15-30 minutes  She denies any leakage of fluid or vaginal bleeding  She reports good fetal movement  Review of Systems   Constitutional: Negative  HENT: Negative  Eyes: Negative  Cardiovascular: Negative  Respiratory: Negative  Endocrine: Negative  Hematologic/Lymphatic: Negative  Skin: Negative  Musculoskeletal: Negative  Gastrointestinal: Positive for abdominal pain  Occasional contractions   Genitourinary: Negative  Neurological: Negative  Psychiatric/Behavioral: Negative  Allergic/Immunologic: Negative  Physical Exam  Constitutional:       General: She is not in acute distress  Appearance: Normal appearance     HENT:      Head: Normocephalic and atraumatic  Cardiovascular:      Rate and Rhythm: Normal rate  Pulmonary:      Effort: Pulmonary effort is normal    Abdominal:      Palpations: Abdomen is soft  Tenderness: There is no abdominal tenderness  Comments: Gravid   Neurological:      General: No focal deficit present  Mental Status: She is alert  Skin:     General: Skin is warm and dry  Psychiatric:         Mood and Affect: Mood normal           OBJECTIVE:  /71 (BP Location: Right arm)   Pulse 83   Temp 98 6 °F (37 °C) (Oral)   Resp 20   LMP 09/10/2022 (Exact Date)   SpO2 99%   There is no height or weight on file to calculate BMI  Labs: No results found for this or any previous visit (from the past 24 hour(s))      Annika Campos MD  Obstetrics & Gynecology PGY-2  6/10/2023  11:30 PM

## 2023-06-11 NOTE — PROCEDURES
Caridad Merlin, a  at 37w1d with an TUNDE of 2023, by Ultrasound, was seen at [facility] for the following procedure(s): $Procedure Type: NST]    Nonstress Test  Reason for NST: Routine  Variability: Moderate  Decelerations: None  Accelerations: Yes  Acoustic Stimulator: No  Baseline: 140 BPM  Uterine Irritability: Yes  Contractions: Irregular                   Interpretation  Nonstress Test Interpretation: Reactive

## 2023-06-15 ENCOUNTER — ROUTINE PRENATAL (OUTPATIENT)
Dept: OBGYN CLINIC | Facility: CLINIC | Age: 18
End: 2023-06-15

## 2023-06-15 VITALS
WEIGHT: 187.6 LBS | HEART RATE: 85 BPM | BODY MASS INDEX: 34.52 KG/M2 | HEIGHT: 62 IN | SYSTOLIC BLOOD PRESSURE: 117 MMHG | DIASTOLIC BLOOD PRESSURE: 75 MMHG

## 2023-06-15 DIAGNOSIS — Z3A.37 37 WEEKS GESTATION OF PREGNANCY: Primary | ICD-10-CM

## 2023-06-15 PROCEDURE — 99214 OFFICE O/P EST MOD 30 MIN: CPT | Performed by: OBSTETRICS & GYNECOLOGY

## 2023-06-15 NOTE — PROGRESS NOTES
[address]  [id number]  2005        ASSESSMENT/PLAN:  Problem List        Other    37 weeks gestation of pregnancy    Overview     · Prenatal panel 22 HIV negative, RPR negative, Rubella immune, hep B, C negative  · CL/GC no collected during this visit, specimen collected today  · NIPTscreen negative, Fragile X, SMD, CF neg  · Last US 23 Vertex, EFW 1950(50%), placenta posterior   · 28 weeks labs wnl  · Tdap vaccine provided 23  · Delivery consent signed  · Birth plan: , she would like Epidural in her labor  Breastfeeding: yes  · Contraception: Nexplanon  Discussed  labor precautions and fetal kick counts    · Return to clinic in 1 weeks         Obesity (BMI 30 0-34 9)    Overview     • Complications associated  with increase BMI  were discussed with patient as hypertensive disorders of pregnancy, gestational diabetes, fetal demise,  birth, fetal macrosomia  serious cardiac, respiratory and hematologic conditions, thromboembolic events and anesthesia related complications  Vaccine counseling    Positive depression screening    Overview     : PHQ-9 = 12  Referral placed to social work         Genetic testing    Overview     NIPT normal         Anemia affecting pregnancy in third trimester    Overview     Lab Results   Component Value Date    HGB 10 5 (L) 2023   on PO iron supplementation            Positive GBS test    Overview     Will need antibiotics in labor            Subjective: 21 y o  Tone Raring 37w6d here for prenatal visit  She denies contractions  She denies leakage of fluid and vaginal bleeding  She endorses good fetal movement  Patient went to triage on 6/10 for contractions, which have since resolved   Discussed that patient is eligible for elective induction at 39 weeks and we could schedule it; patient prefers to wait until next week for discussion and scheduling of induction  Objective:  Pre- Vitals    Flowsheet Row Most Recent Value   Prenatal Assessment    Fetal Heart Rate 145   Fundal Height (cm) 38 cm   Prenatal Vitals    Blood Pressure 117/75   Weight - Scale 85 1 kg (187 lb 9 6 oz)   Urine Albumin/Glucose    Dilation/Effacement/Station    Vaginal Drainage    Edema            Pregnancy Plan:  Pregnancy: Reagan     Delivery Plans  Deliver by GA (weeks): 42  Planned delivery method: Vaginal  Planned delivery location: [facility] L&D  Planned anesthesia: None  Acceptable blood products: All     Post-Delivery Plans  Feeding intentions: Breast Milk and Non-human milk substitute  Planned birth control: Implant      General: Well appearing, no distress  Respiratory: Unlabored breathing  Cardiovascular: Regular rate  Abdomen: Soft, gravid, nontender  Fundal Height: Appropriate for gestational age  Extremities: Warm and well perfused  Non tender          D/w Dr Nay Benavidez MD  PGY-1    12-14weeks: COVID vaccination visitor policy, genetic screening classes  16-18 weeks: sequential screening, level II ultrasound order, flu vaccine  26-28 weeks: 28 week labs (CBC, RPR, 1hr GTT), Rh status/rhogam, FKC, flu vaccine  28-32 weeks: delivery counseling, sign Ma31, tdap, flu vaccine  32 weeks: tdap, flu vaccine, check in card, rediscuss contraception, birth plan  36 weeks: collect GBS/PCN allergy, flu vaccine, SVE

## 2023-06-21 ENCOUNTER — ROUTINE PRENATAL (OUTPATIENT)
Dept: OBGYN CLINIC | Facility: CLINIC | Age: 18
End: 2023-06-21

## 2023-06-21 VITALS
WEIGHT: 189.8 LBS | HEART RATE: 91 BPM | HEIGHT: 62 IN | SYSTOLIC BLOOD PRESSURE: 135 MMHG | BODY MASS INDEX: 34.93 KG/M2 | DIASTOLIC BLOOD PRESSURE: 87 MMHG

## 2023-06-21 DIAGNOSIS — Z3A.38 38 WEEKS GESTATION OF PREGNANCY: ICD-10-CM

## 2023-06-21 DIAGNOSIS — E66.9 OBESITY (BMI 30.0-34.9): ICD-10-CM

## 2023-06-21 DIAGNOSIS — B95.1 POSITIVE GBS TEST: ICD-10-CM

## 2023-06-21 DIAGNOSIS — Z34.93 PRENATAL CARE IN THIRD TRIMESTER: Primary | ICD-10-CM

## 2023-06-21 DIAGNOSIS — O99.013 ANEMIA AFFECTING PREGNANCY IN THIRD TRIMESTER: ICD-10-CM

## 2023-06-21 PROCEDURE — 99214 OFFICE O/P EST MOD 30 MIN: CPT | Performed by: OBSTETRICS & GYNECOLOGY

## 2023-06-21 NOTE — PATIENT INSTRUCTIONS
WARNING SIGNS DURING PREGNANCY  Call our office at [phone] for any of the followin  Vaginal bleeding  2  Sharp abdominal pain that does not go away  3  Fever (more than 100 4 and is not relieved by Tylenol)  4  Persistent vomiting lasting greater than 24 hours  5  Chest pain   6  Pain or burning when you urinate  7  Severe headache that doesn't resolve with Tylenol  8  Blurred vision or seeing spots in your vision  9  Sudden swelling of your face or hands  10  Redness, swelling or pain in a leg  11  A sudden weight gain in just a few days  12  Decrease in your baby's movement (after 28 weeks or the 6th month of pregnancy)  13  A loss of watery fluid from your vagina - can be a gush, a trickle or continuous wetness  14   After 20 weeks of pregnancy, rhythmic cramping (greater than 4 per hour) or menstrual like low/pelvic pain

## 2023-06-21 NOTE — PROGRESS NOTES
Mk Mendez presents today for routine OB visit at 38w5d  Blood Pressure: 135/87  Wt=86 1 kg (189 lb 12 8 oz); Body mass index is 34 71 kg/m² ; TWG=7 167 kg (15 lb 12 8 oz)  Fetal Heart Rate: 137; Fundal Height (cm): 39 cm  Abdomen: gravid, soft, non-tender  She reports   Denies uterine contractions  Denies vaginal bleeding or leaking of fluid  Reports adequate fetal movement of at least 10 movements in 2 hours once daily  Scheduled for ultrasound   Reviewed premature labor precautions and fetal kick counts  Advised to continue medications  EIOL on 23 at 8pm       Current Outpatient Medications   Medication Instructions   • ferrous sulfate 324 mg, Oral, 2 times daily before meals   • Prenatal Vit-Fe Fumarate-FA (Prenatal Complete) 14-0 4 MG TABS 1 tablet, Oral, Daily         G1 Problems (from 11/15/22 to present)     Problem Noted Resolved    Anemia affecting pregnancy in third trimester 2023 by Da Doll MD No    Overview Signed 2023 11:39 AM by Ben Holman MD     Lab Results   Component Value Date    HGB 10 5 (L) 2023   on PO iron supplementation            Positive depression screening 2022 by Fatemeh Jean MD No    Overview Signed 2022 11:44 AM by Fatemeh Jean MD     : PHQ-9 = 12  Referral placed to social work         38 weeks gestation of pregnancy 2022 by Ivelisse Verdin MD No    Overview Addendum 6/15/2023  1:55 PM by Jackie Clark MD     · Prenatal panel 22 HIV negative, RPR negative, Rubella immune, hep B, C negative     · CL/GC no collected during this visit, specimen collected today  · NIPTscreen negative, Fragile X, SMD, CF neg  · Last US 23 Vertex, EFW 1950(50%), placenta posterior   · 28 weeks labs wnl  · Tdap vaccine provided 23  · Delivery consent signed  · Birth plan: , she would like Epidural in her labor  Breastfeeding: yes  · Contraception: Nexplanon  Discussed  labor precautions and fetal kick counts    · Return to clinic in 1 weeks         Obesity (BMI 30 0-34 9) 2022 by David Anand MD No    Overview Addendum 2023 11:59 AM by Ramirez Glasgow MD     • Complications associated  with increase BMI  were discussed with patient as hypertensive disorders of pregnancy, gestational diabetes, fetal demise,  birth, fetal macrosomia  serious cardiac, respiratory and hematologic conditions, thromboembolic events and anesthesia related complications             Vaccine counseling 2022 by David Anand MD No

## 2023-06-23 ENCOUNTER — HOSPITAL ENCOUNTER (OUTPATIENT)
Dept: LABOR AND DELIVERY | Facility: HOSPITAL | Age: 18
Discharge: HOME | DRG: 560 | End: 2023-06-23
Payer: COMMERCIAL

## 2023-06-23 ENCOUNTER — HOSPITAL ENCOUNTER (INPATIENT)
Facility: HOSPITAL | Age: 18
LOS: 4 days | Discharge: HOME | DRG: 560 | End: 2023-06-27
Attending: OBSTETRICS & GYNECOLOGY | Admitting: OBSTETRICS & GYNECOLOGY
Payer: COMMERCIAL

## 2023-06-23 DIAGNOSIS — B95.1 POSITIVE GBS TEST: ICD-10-CM

## 2023-06-23 DIAGNOSIS — Z3A.39 39 WEEKS GESTATION OF PREGNANCY: Primary | ICD-10-CM

## 2023-06-23 DIAGNOSIS — Z30.017 ENCOUNTER FOR INITIAL PRESCRIPTION OF IMPLANTABLE SUBDERMAL CONTRACEPTIVE: ICD-10-CM

## 2023-06-23 LAB
ABO GROUP BLD: NORMAL
BLD GP AB SCN SERPL QL: NEGATIVE
ERYTHROCYTE [DISTWIDTH] IN BLOOD BY AUTOMATED COUNT: 14.3 % (ref 11.6–15.1)
HCT VFR BLD AUTO: 35.6 % (ref 34.8–46.1)
HGB BLD-MCNC: 11.6 G/DL (ref 11.5–15.4)
HOLD SPECIMEN: YES
MCH RBC QN AUTO: 28.2 PG (ref 26.8–34.3)
MCHC RBC AUTO-ENTMCNC: 32.6 G/DL (ref 31.4–37.4)
MCV RBC AUTO: 86 FL (ref 82–98)
PLATELET # BLD AUTO: 146 THOUSANDS/UL (ref 149–390)
PMV BLD AUTO: 10.7 FL (ref 8.9–12.7)
RBC # BLD AUTO: 4.12 MILLION/UL (ref 3.81–5.12)
RH BLD: POSITIVE
SPECIMEN EXPIRATION DATE: NORMAL
WBC # BLD AUTO: 9.69 THOUSAND/UL (ref 4.31–10.16)

## 2023-06-23 PROCEDURE — 80053 COMPREHEN METABOLIC PANEL: CPT

## 2023-06-23 PROCEDURE — 86780 TREPONEMA PALLIDUM: CPT

## 2023-06-23 PROCEDURE — 86900 BLOOD TYPING SEROLOGIC ABO: CPT

## 2023-06-23 PROCEDURE — 86850 RBC ANTIBODY SCREEN: CPT

## 2023-06-23 PROCEDURE — 85027 COMPLETE CBC AUTOMATED: CPT

## 2023-06-23 PROCEDURE — NC001 PR NO CHARGE: Performed by: OBSTETRICS & GYNECOLOGY

## 2023-06-23 PROCEDURE — 86901 BLOOD TYPING SEROLOGIC RH(D): CPT

## 2023-06-23 RX ORDER — SODIUM CHLORIDE, SODIUM LACTATE, POTASSIUM CHLORIDE, CALCIUM CHLORIDE 600; 310; 30; 20 MG/100ML; MG/100ML; MG/100ML; MG/100ML
125 INJECTION, SOLUTION INTRAVENOUS CONTINUOUS
Status: DISCONTINUED | OUTPATIENT
Start: 2023-06-23 | End: 2023-06-27 | Stop reason: HOSPADM

## 2023-06-23 RX ORDER — ONDANSETRON 2 MG/ML
4 INJECTION INTRAMUSCULAR; INTRAVENOUS EVERY 6 HOURS PRN
Status: DISCONTINUED | OUTPATIENT
Start: 2023-06-23 | End: 2023-06-25

## 2023-06-23 RX ORDER — BUPIVACAINE HYDROCHLORIDE 2.5 MG/ML
30 INJECTION, SOLUTION EPIDURAL; INFILTRATION; INTRACAUDAL ONCE AS NEEDED
Status: DISCONTINUED | OUTPATIENT
Start: 2023-06-23 | End: 2023-06-25

## 2023-06-23 RX ADMIN — SODIUM CHLORIDE, SODIUM LACTATE, POTASSIUM CHLORIDE, AND CALCIUM CHLORIDE 125 ML/HR: .6; .31; .03; .02 INJECTION, SOLUTION INTRAVENOUS at 22:27

## 2023-06-23 RX ADMIN — Medication 50 MCG: at 21:49

## 2023-06-23 RX ADMIN — SODIUM CHLORIDE 5 MILLION UNITS: 0.9 INJECTION, SOLUTION INTRAVENOUS at 22:27

## 2023-06-24 ENCOUNTER — ANESTHESIA (INPATIENT)
Dept: ANESTHESIOLOGY | Facility: HOSPITAL | Age: 18
DRG: 560 | End: 2023-06-24
Payer: COMMERCIAL

## 2023-06-24 ENCOUNTER — ANESTHESIA EVENT (INPATIENT)
Dept: ANESTHESIOLOGY | Facility: HOSPITAL | Age: 18
DRG: 560 | End: 2023-06-24
Payer: COMMERCIAL

## 2023-06-24 PROBLEM — J45.909 ASTHMA: Status: ACTIVE | Noted: 2023-06-24

## 2023-06-24 PROBLEM — O13.3 GESTATIONAL HYPERTENSION, THIRD TRIMESTER: Status: ACTIVE | Noted: 2023-06-24

## 2023-06-24 LAB
ALBUMIN SERPL BCP-MCNC: 3.3 G/DL (ref 3.5–5)
ALP SERPL-CCNC: 116 U/L (ref 34–104)
ALT SERPL W P-5'-P-CCNC: 8 U/L (ref 7–52)
ANION GAP SERPL CALCULATED.3IONS-SCNC: 8 MMOL/L
AST SERPL W P-5'-P-CCNC: 12 U/L (ref 13–39)
BILIRUB SERPL-MCNC: 0.39 MG/DL (ref 0.2–1)
BUN SERPL-MCNC: 6 MG/DL (ref 5–25)
CALCIUM ALBUM COR SERPL-MCNC: 8.7 MG/DL (ref 8.3–10.1)
CALCIUM SERPL-MCNC: 8.1 MG/DL (ref 8.4–10.2)
CHLORIDE SERPL-SCNC: 110 MMOL/L (ref 96–108)
CO2 SERPL-SCNC: 20 MMOL/L (ref 21–32)
CREAT SERPL-MCNC: 0.57 MG/DL (ref 0.6–1.3)
CREAT UR-MCNC: 72.4 MG/DL
GFR SERPL CREATININE-BSD FRML MDRD: 134 ML/MIN/1.73SQ M
GLUCOSE SERPL-MCNC: 98 MG/DL (ref 65–140)
POTASSIUM SERPL-SCNC: 3.4 MMOL/L (ref 3.5–5.3)
PROT SERPL-MCNC: 5.9 G/DL (ref 6.4–8.4)
PROT UR-MCNC: 13 MG/DL
PROT/CREAT UR: 0.18 MG/G{CREAT} (ref 0–0.1)
SODIUM SERPL-SCNC: 138 MMOL/L (ref 135–147)

## 2023-06-24 PROCEDURE — 84156 ASSAY OF PROTEIN URINE: CPT

## 2023-06-24 PROCEDURE — 82570 ASSAY OF URINE CREATININE: CPT

## 2023-06-24 PROCEDURE — 4A1HXCZ MONITORING OF PRODUCTS OF CONCEPTION, CARDIAC RATE, EXTERNAL APPROACH: ICD-10-PCS | Performed by: OBSTETRICS & GYNECOLOGY

## 2023-06-24 RX ORDER — LIDOCAINE HYDROCHLORIDE AND EPINEPHRINE 15; 5 MG/ML; UG/ML
INJECTION, SOLUTION EPIDURAL AS NEEDED
Status: DISCONTINUED | OUTPATIENT
Start: 2023-06-24 | End: 2023-06-25 | Stop reason: HOSPADM

## 2023-06-24 RX ORDER — ROPIVACAINE HYDROCHLORIDE 2 MG/ML
INJECTION, SOLUTION EPIDURAL; INFILTRATION; PERINEURAL AS NEEDED
Status: DISCONTINUED | OUTPATIENT
Start: 2023-06-24 | End: 2023-06-25 | Stop reason: HOSPADM

## 2023-06-24 RX ORDER — OXYTOCIN/RINGER'S LACTATE 30/500 ML
1-30 PLASTIC BAG, INJECTION (ML) INTRAVENOUS
Status: DISCONTINUED | OUTPATIENT
Start: 2023-06-24 | End: 2023-06-25

## 2023-06-24 RX ADMIN — SODIUM CHLORIDE 2.5 MILLION UNITS: 9 INJECTION, SOLUTION INTRAVENOUS at 14:19

## 2023-06-24 RX ADMIN — SODIUM CHLORIDE 2.5 MILLION UNITS: 9 INJECTION, SOLUTION INTRAVENOUS at 06:44

## 2023-06-24 RX ADMIN — SODIUM CHLORIDE 2.5 MILLION UNITS: 9 INJECTION, SOLUTION INTRAVENOUS at 22:40

## 2023-06-24 RX ADMIN — SODIUM CHLORIDE 2.5 MILLION UNITS: 9 INJECTION, SOLUTION INTRAVENOUS at 18:24

## 2023-06-24 RX ADMIN — Medication 2 MILLI-UNITS/MIN: at 04:49

## 2023-06-24 RX ADMIN — LIDOCAINE HYDROCHLORIDE AND EPINEPHRINE 3 ML: 15; 5 INJECTION, SOLUTION EPIDURAL at 21:08

## 2023-06-24 RX ADMIN — SODIUM CHLORIDE, SODIUM LACTATE, POTASSIUM CHLORIDE, AND CALCIUM CHLORIDE 125 ML/HR: .6; .31; .03; .02 INJECTION, SOLUTION INTRAVENOUS at 08:09

## 2023-06-24 RX ADMIN — ROPIVACAINE HYDROCHLORIDE 3 ML: 2 INJECTION EPIDURAL; INFILTRATION; PERINEURAL at 21:16

## 2023-06-24 RX ADMIN — ROPIVACAINE HYDROCHLORIDE 5 ML: 2 INJECTION EPIDURAL; INFILTRATION; PERINEURAL at 21:12

## 2023-06-24 RX ADMIN — SODIUM CHLORIDE 2.5 MILLION UNITS: 9 INJECTION, SOLUTION INTRAVENOUS at 02:22

## 2023-06-24 RX ADMIN — ROPIVACAINE HYDROCHLORIDE 10 ML/HR: 2 INJECTION EPIDURAL; INFILTRATION; PERINEURAL at 21:18

## 2023-06-24 RX ADMIN — SODIUM CHLORIDE 2.5 MILLION UNITS: 9 INJECTION, SOLUTION INTRAVENOUS at 10:55

## 2023-06-24 RX ADMIN — ROPIVACAINE HYDROCHLORIDE: 2 INJECTION, SOLUTION EPIDURAL; INFILTRATION at 21:08

## 2023-06-24 RX ADMIN — SODIUM CHLORIDE, SODIUM LACTATE, POTASSIUM CHLORIDE, AND CALCIUM CHLORIDE 125 ML/HR: .6; .31; .03; .02 INJECTION, SOLUTION INTRAVENOUS at 21:24

## 2023-06-24 NOTE — OB LABOR/OXYTOCIN SAFETY PROGRESS
Oxytocin Safety Progress Check Note - Maximiliano Lopez 21 y o  female MRN: [id number]    Unit/Bed#: L&D 322-01 Encounter: [id number]    Dose (mert-units/min) Oxytocin: 12 mert-units/min  Contraction Frequency (minutes): 2  Contraction Quality: Strong  Tachysystole: No   Cervical Dilation: 6-7        Cervical Effacement: 50  Fetal Station: -1  Baseline Rate: 145 bpm  Fetal Heart Rate: 130 BPM  FHR Category: Category II  Oxytocin Safety Progress Check: Safety check completed            Vital Signs:   Vitals:    06/24/23 1456   BP: 114/76   Pulse: 87   Resp:    Temp: 98 6 °F (37 °C)       Notes/comments:   Pt declines epidural, is moving, using ball  Monitoring is mostly continuous  Intermittent small variable decelerations are seen in the setting of moderate variability and accels  For this reason I recommend continuing oxytocin for induction of labor  SROM occurred approx 1 hour ago  On my exam, prominent membranes (forebag) are noted, AROM is performed with more clear fluid noted  Will continue to monitor         Ben Roche MD 6/24/2023 3:39 PM

## 2023-06-24 NOTE — OB LABOR/OXYTOCIN SAFETY PROGRESS
Labor Progress Note - Ritika Arreguin 21 y o  female MRN: [id number]    Unit/Bed#: L&D 322-01 Encounter: [id number]       Contraction Frequency (minutes): 2-3  Contraction Quality: Mild  Tachysystole: No   Cervical Dilation: 1        Cervical Effacement: 70  Fetal Station: -3  Baseline Rate: 135 bpm     FHR Category: Category I               Vital Signs:   Vitals:    06/24/23 0114   BP: 121/69   Pulse: 78   Resp:    Temp:        Notes/comments:   SVE 1/70/-3  FHT category 1  Toscano balloon placed  Plan for pitocin for continued induction management  PROCEDURE:  TOSCANO BALLOON PLACEMENT    A 24F toscano with a 30cc balloon was selected, speculum exam was performed and cervix was located, toscano was introduced over sterile gloved hands  Balloon advanced through cervix beyond the internal cervical os  A small amount amount of sterile saline solution was instilled in the balloon to confirm placement  Placement was confirmed to be beyond the internal cervical os  A total of 60cc of sterile saline solution was placed into the balloon  Pt tolerated well  Instructions left with RN to place toscano to gravity with a 1L bag of IV fluid  Notify MD when toscano dislodged            Mohini Palomo MD 6/24/2023 4:24 AM

## 2023-06-24 NOTE — OB LABOR/OXYTOCIN SAFETY PROGRESS
Oxytocin Safety Progress Check Note - Yas Draper 21 y o  female MRN: [id number]    Unit/Bed#: L&D 322-01 Encounter: [id number]    Dose (mert-units/min) Oxytocin: 14 mert-units/min  Contraction Frequency (minutes): 2-4  Contraction Quality: Moderate  Tachysystole: No   Cervical Dilation: 5-6        Cervical Effacement: 50  Fetal Station: -2  Baseline Rate: 140 bpm  Fetal Heart Rate: 130 BPM  FHR Category: Category I  Oxytocin Safety Progress Check: Safety check completed            Vital Signs:   Vitals:    06/24/23 1704   BP:    Pulse:    Resp:    Temp: 98 °F (36 7 °C)       Notes/comments: Patient doing well breathing through her contractions however becoming very uncomfortable, SVE unchanged possibly decreased from prior exam, cervix still thick, fetal heart tracing has been category 1 so we will continue to increase Pitocin and recheck in about 2 hours        Dave Flores MD 6/24/2023 5:53 PM

## 2023-06-24 NOTE — OB LABOR/OXYTOCIN SAFETY PROGRESS
Oxytocin Safety Progress Check Note - Adrian Mini 21 y o  female MRN: [id number]    Unit/Bed#: L&D 322-01 Encounter: [id number]    Dose (mert-units/min) Oxytocin: 10 mert-units/min  Contraction Frequency (minutes): 2-3 5  Contraction Quality: Mild  Tachysystole: No   Cervical Dilation: 5-6        Cervical Effacement: 80  Fetal Station: -3  Baseline Rate: 140 bpm  Fetal Heart Rate: 130 BPM  FHR Category: Category I               Vital Signs:   Vitals:    06/24/23 1344   BP:    Pulse:    Resp: 18   Temp: 97 8 °F (36 6 °C)       Notes/comments: Khanna balloon #2 out, excellent progress made, SVE as above, will continue to increase Pitocin however fetal head too high in pelvis for rupture at this time, fetal heart tracing has been category 1, will recheck in about 2 hours        Merlinda Gibbon, MD 6/24/2023 1:45 PM

## 2023-06-24 NOTE — OB LABOR/OXYTOCIN SAFETY PROGRESS
Oxytocin Safety Progress Check Note - Gaviron Sarabia 21 y o  female MRN: [id number]    Unit/Bed#: L&D 322-01 Encounter: [id number]    Dose (mert-units/min) Oxytocin: 6 mert-units/min (decreased per Dr Tripp Celis prior to new fam balloon placement)  Contraction Frequency (minutes): 2-3  Contraction Quality: Mild  Tachysystole: No   Cervical Dilation: 1-2        Cervical Effacement: 60  Fetal Station: -3  Baseline Rate: 130 bpm     FHR Category: Category I               Vital Signs:   Vitals:    06/24/23 0735   BP:    Pulse:    Resp: 16   Temp: 98 8 °F (37 1 °C)       Notes/comments: Initial Fam balloon out, repeat cervical exam revealed dilated internal os however external os still 1 to 2 cm so a second Fam balloon was placed without difficulty and Pitocin was turned down to 6 from 10, fetal heart tracing has been category 1, will recheck once Fam balloon is out        Howard Delacruz MD 6/24/2023 8:43 AM

## 2023-06-24 NOTE — PLAN OF CARE
Problem: ANTEPARTUM  Goal: Maintain pregnancy as long as maternal and/or fetal condition is stable  Description: INTERVENTIONS:  - Maternal surveillance  - Fetal surveillance  - Monitor uterine activity  - Medications as ordered  - Bedrest  Outcome: Progressing     Problem: BIRTH - VAGINAL/ SECTION  Goal: Fetal and maternal status remain reassuring during the birth process  Description: INTERVENTIONS:  - Monitor vital signs  - Monitor fetal heart rate  - Monitor uterine activity  - Monitor labor progression (vaginal delivery)  - DVT prophylaxis  - Antibiotic prophylaxis  Outcome: Progressing  Goal: Emotionally satisfying birthing experience for mother/fetus  Description: Interventions:  - Assess, plan, implement and evaluate the nursing care given to the patient in labor  - Advocate the philosophy that each childbirth experience is a unique experience and support the family's chosen level of involvement and control during the labor process   - Actively participate in both the patient's and family's teaching of the birth process  - Consider cultural, Adventist and age-specific factors and plan care for the patient in labor  Outcome: Progressing     Problem: POSTPARTUM  Goal: Experiences normal postpartum course  Description: INTERVENTIONS:  - Monitor maternal vital signs  - Assess uterine involution and lochia  Outcome: Progressing  Goal: Appropriate maternal -  bonding  Description: INTERVENTIONS:  - Identify family support  - Assess for appropriate maternal/infant bonding   -Encourage maternal/infant bonding opportunities  - Referral to  or  as needed  Outcome: Progressing  Goal: Establishment of infant feeding pattern  Description: INTERVENTIONS:  - Assess breast/bottle feeding  - Refer to lactation as needed  Outcome: Progressing  Goal: Incision(s), wounds(s) or drain site(s) healing without S/S of infection  Description: INTERVENTIONS  - Assess and document dressing, incision, wound bed, drain sites and surrounding tissue  - Provide patient and family education  Outcome: Progressing

## 2023-06-24 NOTE — PLAN OF CARE
Problem: ANTEPARTUM  Goal: Maintain pregnancy as long as maternal and/or fetal condition is stable  Description: INTERVENTIONS:  - Maternal surveillance  - Fetal surveillance  - Monitor uterine activity  - Medications as ordered  - Bedrest  2023 by Fermin Santos RN  Outcome: Progressing  2023 by Fermin Santos RN  Outcome: Progressing     Problem: BIRTH - VAGINAL/ SECTION  Goal: Fetal and maternal status remain reassuring during the birth process  Description: INTERVENTIONS:  - Monitor vital signs  - Monitor fetal heart rate  - Monitor uterine activity  - Monitor labor progression (vaginal delivery)  - DVT prophylaxis  - Antibiotic prophylaxis  2023 by Fermin Santos RN  Outcome: Progressing  2023 by Fermin Santos RN  Outcome: Progressing  Goal: Emotionally satisfying birthing experience for mother/fetus  Description: Interventions:  - Assess, plan, implement and evaluate the nursing care given to the patient in labor  - Advocate the philosophy that each childbirth experience is a unique experience and support the family's chosen level of involvement and control during the labor process   - Actively participate in both the patient's and family's teaching of the birth process  - Consider cultural, Faith and age-specific factors and plan care for the patient in labor  2023 by Fermin Santos RN  Outcome: Progressing  2023 by Fermin Santos RN  Outcome: Progressing     Problem: POSTPARTUM  Goal: Experiences normal postpartum course  Description: INTERVENTIONS:  - Monitor maternal vital signs  - Assess uterine involution and lochia  2023 by Fermin Santos RN  Outcome: Progressing  2023 by Fermin Santos RN  Outcome: Progressing  Goal: Appropriate maternal -  bonding  Description: INTERVENTIONS:  - Identify family support  - Assess for appropriate maternal/infant bonding   -Encourage maternal/infant bonding opportunities  - Referral to  or  as needed  6/24/2023 1445 by Alis Caceres RN  Outcome: Progressing  6/24/2023 0704 by Alis Caceres RN  Outcome: Progressing  Goal: Establishment of infant feeding pattern  Description: INTERVENTIONS:  - Assess breast/bottle feeding  - Refer to lactation as needed  6/24/2023 0704 by Alis Caceres RN  Outcome: Progressing  6/24/2023 0704 by Alis Caceres RN  Outcome: Progressing  Goal: Incision(s), wounds(s) or drain site(s) healing without S/S of infection  Description: INTERVENTIONS  - Assess and document dressing, incision, wound bed, drain sites and surrounding tissue  - Provide patient and family education  6/24/2023 0704 by Alis Caceres RN  Outcome: Progressing  6/24/2023 0704 by Alis Caceres RN  Outcome: Progressing

## 2023-06-24 NOTE — H&P
[address] [facility] 21 y o  female MRN: [id number]  Unit/Bed#: L&D 322-01 Encounter: [id number]    Assessment: 21 y o  Jennifer Gomez at 39w0d admitted for eIOL  SVE: 0/50/-4 on 6/21  FHT: 135bpm  Clinical EFW: 7lb ; Cephalic confirmed by TAUS  GBS status: positive   Postpartum contraception plan: Nexplanon    Plan:   Positive GBS test  Assessment & Plan  PCN gtt for intrapartum management    Anemia affecting pregnancy in third trimester  Assessment & Plan  Prior to admission hemoglobin 10 5  Hgb 11 6 on admission    Obesity (BMI 30 0-34  9)  Assessment & Plan  BMI 34    * 39 weeks gestation of pregnancy  Assessment & Plan  Admit   T&S, CBC, RPR  CLD  IV fluids  GBS prophylaxis is needed, PCN ordered  Induction with PO cytotec            Discussed case and plan w/ Dr David Rodríguez      Chief Complaint: induction    HPI: Michael Raza is a 21 y o  Jennifer Gomez with an TUNDE of 6/30/2023, by Ultrasound at 39w0d who is being admitted for induction of labor  She denies having uterine contractions, has no LOF, and reports no VB  She states she has felt good FM  Patient Active Problem List   Diagnosis   • 39 weeks gestation of pregnancy   • Obesity (BMI 30 0-34  9)   • Vaccine counseling   • Positive depression screening   • Genetic testing   • Anemia affecting pregnancy in third trimester   • Positive GBS test       Baby complications/comments: none    Review of Systems   Constitutional: Negative for chills and fever  HENT: Negative for congestion, sinus pressure and sinus pain  Eyes: Negative for visual disturbance  Respiratory: Negative for cough, shortness of breath and wheezing  Cardiovascular: Negative for chest pain, palpitations and leg swelling  Gastrointestinal: Negative for abdominal pain, constipation, diarrhea, nausea and vomiting  Genitourinary: Negative for dysuria, vaginal bleeding and vaginal discharge  Skin: Negative for color change, pallor and rash     Neurological: Negative for weakness and light-headedness  Psychiatric/Behavioral: Negative for agitation and behavioral problems  OB Hx:  OB History    Para Term  AB Living   1 0 0 0 0 0   SAB IAB Ectopic Multiple Live Births   0 0 0 0 0      # Outcome Date GA Lbr Brenton/2nd Weight Sex Delivery Anes PTL Lv   1 Current                Past Medical Hx:  Past Medical History:   Diagnosis Date   • Asthma        Past Surgical hx:  History reviewed  No pertinent surgical history  No Known Allergies    Medications Prior to Admission   Medication   • ferrous sulfate 324 (65 Fe) mg   • Prenatal Vit-Fe Fumarate-FA (Prenatal Complete) 14-0 4 MG TABS       Objective:  Temp:  [98 6 °F (37 °C)] 98 6 °F (37 °C)  Resp:  [20] 20  Body mass index is 34 71 kg/m²  Physical Exam:  Physical Exam  Constitutional:       General: She is not in acute distress  Genitourinary:      Vulva normal    HENT:      Head: Normocephalic  Right Ear: External ear normal       Left Ear: External ear normal    Eyes:      General: No scleral icterus  Right eye: No discharge  Left eye: No discharge  Conjunctiva/sclera: Conjunctivae normal    Cardiovascular:      Rate and Rhythm: Normal rate and regular rhythm  Pulses: Normal pulses  Heart sounds: Normal heart sounds  Pulmonary:      Effort: Pulmonary effort is normal  No respiratory distress  Breath sounds: Normal breath sounds  Abdominal:      Palpations: Abdomen is soft  Tenderness: There is no abdominal tenderness  There is no guarding  Comments: Gravid uterus   Musculoskeletal:         General: No swelling or tenderness  Normal range of motion  Cervical back: Normal range of motion  Right lower leg: No edema  Left lower leg: No edema  Neurological:      Mental Status: She is alert and oriented to person, place, and time  Mental status is at baseline  Skin:     General: Skin is warm and dry        Capillary Refill: Capillary refill takes less than 2 seconds  Psychiatric:         Behavior: Behavior normal          Thought Content: Thought content normal    Vitals and nursing note reviewed  Exam conducted with a chaperone present              FHT:   135bpm, moderate variability, 15x15 accelerations, no decelerations    TOCO:    ctx absent    Lab Results   Component Value Date    WBC 9 69 06/23/2023    HGB 11 6 06/23/2023    HCT 35 6 06/23/2023     (L) 06/23/2023     Lab Results   Component Value Date    K 3 9 10/24/2022     10/24/2022    CO2 22 10/24/2022    BUN 10 10/24/2022    CREATININE 0 56 (L) 10/24/2022    AST 17 10/24/2022    ALT 16 10/24/2022     Prenatal Labs: Reviewed     Blood type: A pos  Antibody: neg  GBS: pos  HIV: neg  Rubella: immune  Syphilis IgM/IgG: neg  HBsAg: neg  HCAb: neg  Chlamydia: neg  Gonorrhea: neg  Diabetes 1 hour screen: wnl  3 hour glucose: na  Platelets: 327  Hgb: 10 5  >2 Midnights  INPATIENT     Signature/Title: Ran Tao MD  Date: 6/23/2023  Time: 10:09 PM

## 2023-06-25 LAB
BASE EXCESS BLDCOA CALC-SCNC: -10 MMOL/L (ref 3–11)
BASE EXCESS BLDCOV CALC-SCNC: -6.6 MMOL/L (ref 1–9)
HCO3 BLDCOA-SCNC: 18.6 MMOL/L (ref 17.3–27.3)
HCO3 BLDCOV-SCNC: 16.6 MMOL/L (ref 12.2–28.6)
O2 CT VFR BLDCOA CALC: 7.4 ML/DL
OXYHGB MFR BLDCOA: 34.4 %
OXYHGB MFR BLDCOV: 83.3 %
PCO2 BLDCOA: 50.5 MM[HG] (ref 30–60)
PCO2 BLDCOV: 28 MM HG (ref 27–43)
PH BLDCOA: 7.18 [PH] (ref 7.23–7.43)
PH BLDCOV: 7.39 [PH] (ref 7.19–7.49)
PO2 BLDCOA: 20.9 MM HG (ref 5–25)
PO2 BLDCOV: 41.4 MM HG (ref 15–45)
SAO2 % BLDCOV: 18.3 ML/DL
TREPONEMA PALLIDUM IGG+IGM AB [PRESENCE] IN SERUM OR PLASMA BY IMMUNOASSAY: NORMAL

## 2023-06-25 PROCEDURE — 82805 BLOOD GASES W/O2 SATURATION: CPT

## 2023-06-25 PROCEDURE — NC001 PR NO CHARGE: Performed by: OBSTETRICS & GYNECOLOGY

## 2023-06-25 PROCEDURE — 0UQGXZZ REPAIR VAGINA, EXTERNAL APPROACH: ICD-10-PCS | Performed by: OBSTETRICS & GYNECOLOGY

## 2023-06-25 PROCEDURE — 59409 OBSTETRICAL CARE: CPT | Performed by: OBSTETRICS & GYNECOLOGY

## 2023-06-25 PROCEDURE — 10H07YZ INSERTION OF OTHER DEVICE INTO PRODUCTS OF CONCEPTION, VIA NATURAL OR ARTIFICIAL OPENING: ICD-10-PCS | Performed by: OBSTETRICS & GYNECOLOGY

## 2023-06-25 PROCEDURE — 10907ZC DRAINAGE OF AMNIOTIC FLUID, THERAPEUTIC FROM PRODUCTS OF CONCEPTION, VIA NATURAL OR ARTIFICIAL OPENING: ICD-10-PCS | Performed by: OBSTETRICS & GYNECOLOGY

## 2023-06-25 PROCEDURE — 0JHF3HZ INSERTION OF CONTRACEPTIVE DEVICE INTO LEFT UPPER ARM SUBCUTANEOUS TISSUE AND FASCIA, PERCUTANEOUS APPROACH: ICD-10-PCS | Performed by: OBSTETRICS & GYNECOLOGY

## 2023-06-25 RX ORDER — IBUPROFEN 600 MG/1
600 TABLET ORAL EVERY 6 HOURS
Status: DISCONTINUED | OUTPATIENT
Start: 2023-06-25 | End: 2023-06-27 | Stop reason: HOSPADM

## 2023-06-25 RX ORDER — ACETAMINOPHEN 325 MG/1
650 TABLET ORAL EVERY 4 HOURS PRN
Status: DISCONTINUED | OUTPATIENT
Start: 2023-06-25 | End: 2023-06-27 | Stop reason: HOSPADM

## 2023-06-25 RX ORDER — SIMETHICONE 80 MG
80 TABLET,CHEWABLE ORAL 4 TIMES DAILY PRN
Status: DISCONTINUED | OUTPATIENT
Start: 2023-06-25 | End: 2023-06-27 | Stop reason: HOSPADM

## 2023-06-25 RX ORDER — DOCUSATE SODIUM 100 MG/1
100 CAPSULE, LIQUID FILLED ORAL 2 TIMES DAILY
Status: DISCONTINUED | OUTPATIENT
Start: 2023-06-25 | End: 2023-06-27 | Stop reason: HOSPADM

## 2023-06-25 RX ORDER — DIAPER,BRIEF,INFANT-TODD,DISP
1 EACH MISCELLANEOUS DAILY PRN
Status: DISCONTINUED | OUTPATIENT
Start: 2023-06-25 | End: 2023-06-27 | Stop reason: HOSPADM

## 2023-06-25 RX ORDER — OXYTOCIN/RINGER'S LACTATE 30/500 ML
62.5 PLASTIC BAG, INJECTION (ML) INTRAVENOUS ONCE
Status: DISCONTINUED | OUTPATIENT
Start: 2023-06-25 | End: 2023-06-27 | Stop reason: HOSPADM

## 2023-06-25 RX ORDER — DIPHENHYDRAMINE HCL 25 MG
25 TABLET ORAL EVERY 6 HOURS PRN
Status: DISCONTINUED | OUTPATIENT
Start: 2023-06-25 | End: 2023-06-27 | Stop reason: HOSPADM

## 2023-06-25 RX ORDER — ROPIVACAINE HYDROCHLORIDE 5 MG/ML
INJECTION, SOLUTION EPIDURAL; INFILTRATION; PERINEURAL AS NEEDED
Status: DISCONTINUED | OUTPATIENT
Start: 2023-06-25 | End: 2023-06-25 | Stop reason: HOSPADM

## 2023-06-25 RX ORDER — FERROUS SULFATE 325(65) MG
325 TABLET ORAL
Status: DISCONTINUED | OUTPATIENT
Start: 2023-06-26 | End: 2023-06-27 | Stop reason: HOSPADM

## 2023-06-25 RX ORDER — OXYTOCIN/RINGER'S LACTATE 30/500 ML
250 PLASTIC BAG, INJECTION (ML) INTRAVENOUS ONCE
Status: DISCONTINUED | OUTPATIENT
Start: 2023-06-25 | End: 2023-06-25

## 2023-06-25 RX ORDER — ONDANSETRON 2 MG/ML
4 INJECTION INTRAMUSCULAR; INTRAVENOUS EVERY 8 HOURS PRN
Status: DISCONTINUED | OUTPATIENT
Start: 2023-06-25 | End: 2023-06-27 | Stop reason: HOSPADM

## 2023-06-25 RX ORDER — CALCIUM CARBONATE 500 MG/1
1000 TABLET, CHEWABLE ORAL DAILY PRN
Status: DISCONTINUED | OUTPATIENT
Start: 2023-06-25 | End: 2023-06-27 | Stop reason: HOSPADM

## 2023-06-25 RX ADMIN — SODIUM CHLORIDE 2.5 MILLION UNITS: 9 INJECTION, SOLUTION INTRAVENOUS at 11:05

## 2023-06-25 RX ADMIN — SODIUM CHLORIDE 2.5 MILLION UNITS: 9 INJECTION, SOLUTION INTRAVENOUS at 07:15

## 2023-06-25 RX ADMIN — HYDROCORTISONE 1 APPLICATION: 1 CREAM TOPICAL at 15:43

## 2023-06-25 RX ADMIN — ROPIVACAINE HYDROCHLORIDE: 2 INJECTION, SOLUTION EPIDURAL; INFILTRATION at 04:25

## 2023-06-25 RX ADMIN — ROPIVACAINE HYDROCHLORIDE 4 ML: 5 INJECTION EPIDURAL; INFILTRATION; PERINEURAL at 10:19

## 2023-06-25 RX ADMIN — ROPIVACAINE HYDROCHLORIDE 6 ML: 5 INJECTION EPIDURAL; INFILTRATION; PERINEURAL at 06:17

## 2023-06-25 RX ADMIN — BENZOCAINE AND LEVOMENTHOL 1 APPLICATION: 200; 5 SPRAY TOPICAL at 15:43

## 2023-06-25 RX ADMIN — ONDANSETRON 4 MG: 2 INJECTION INTRAMUSCULAR; INTRAVENOUS at 00:05

## 2023-06-25 RX ADMIN — IBUPROFEN 600 MG: 600 TABLET, FILM COATED ORAL at 16:19

## 2023-06-25 RX ADMIN — ROPIVACAINE HYDROCHLORIDE 4 ML: 2 INJECTION EPIDURAL; INFILTRATION; PERINEURAL at 10:19

## 2023-06-25 RX ADMIN — Medication 999 MILLI-UNITS/MIN: at 15:05

## 2023-06-25 RX ADMIN — SODIUM CHLORIDE 2.5 MILLION UNITS: 9 INJECTION, SOLUTION INTRAVENOUS at 02:58

## 2023-06-25 RX ADMIN — WITCH HAZEL 1 PAD: 500 SOLUTION RECTAL; TOPICAL at 15:43

## 2023-06-25 RX ADMIN — SODIUM CHLORIDE, SODIUM LACTATE, POTASSIUM CHLORIDE, AND CALCIUM CHLORIDE 125 ML/HR: .6; .31; .03; .02 INJECTION, SOLUTION INTRAVENOUS at 10:14

## 2023-06-25 NOTE — DISCHARGE SUMMARY
Discharge Summary - OB/GYN   Alexia Gilbert 21 y o  female MRN: [id number]  Unit/Bed#: L&D 322-01 Encounter: [id number]    Admission Date: 2023     Discharge Date: 2023    Admitting Diagnosis:   1  Pregnancy at 39w0d   2  Anemia in pregnancy  3  Elevated BMI  4  GBS positive status  5  Gestational hypertension    Discharge Diagnosis:   1    2  Anemia in pregnancy  3  Elevated BMI  4  GBS positive status  5  Gestational hypertension    Procedures: spontaneous vaginal delivery    Delivering Attending: Rhonda Bejarano MD  Discharge Attending: Maria Teresa Course:     Ms Alexia Gilbert is a 21 y o   at 39w0d  She presented to labor and delivery for elective induction of labor  Her pregnancy was complicated by gestational hypertension diagnosed on admission  Induction was started with Cytotec administration with Khanna balloon placement and Pitocin to follow  Spontaneous rupture of membranes was appreciated with clear fluid and the patient received an epidural for analgesia  Patient went through 2-day labor course with cervical ripening until reaching the active phase of labor  Patient then made steady cervical change to complete and began pushing  She delivered a viable female  on 23 at 1448  Patient tolerated the procedure well and was transferred to recovery in stable condition  Her post-partum course was uncomplicated  Her post-partum pain was well controlled with oral analgesics  She received Nexplanon for contraception  On day of discharge, she was ambulating and able to reasonably perform all ADLs  She was voiding and had appropriate bowel function  Pain was well controlled  She was discharged home on post-partum day #2 without complications  Patient was instructed to follow up with her OB as an outpatient and was given appropriate warnings to call provider if she develops signs of infection or uncontrolled pain      Complications: none apparent    Condition at discharge: good     Discharge instructions/Information to patient and family:   See after visit summary for information provided to patient and family  Provisions for Follow-Up Care:  See after visit summary for information related to follow-up care and any pertinent home health orders  Disposition: Home    Planned Readmission: No    Discharge Medications: For a complete list of the patient's medications, please refer to her med rec

## 2023-06-25 NOTE — PLAN OF CARE
Problem: POSTPARTUM  Goal: Experiences normal postpartum course  Description: INTERVENTIONS:  - Monitor maternal vital signs  - Assess uterine involution and lochia  Outcome: Progressing  Goal: Appropriate maternal -  bonding  Description: INTERVENTIONS:  - Identify family support  - Assess for appropriate maternal/infant bonding   -Encourage maternal/infant bonding opportunities  - Referral to  or  as needed  Outcome: Progressing  Goal: Establishment of infant feeding pattern  Description: INTERVENTIONS:  - Assess breast/bottle feeding  - Refer to lactation as needed  Outcome: Progressing  Goal: Incision(s), wounds(s) or drain site(s) healing without S/S of infection  Description: INTERVENTIONS  - Assess and document dressing, incision, wound bed, drain sites and surrounding tissue  - Provide patient and family education  - Perform skin care/dressing changes every  Outcome: Progressing     Problem: ANTEPARTUM  Goal: Maintain pregnancy as long as maternal and/or fetal condition is stable  Description: INTERVENTIONS:  - Maternal surveillance  - Fetal surveillance  - Monitor uterine activity  - Medications as ordered  - Bedrest  Outcome: Adequate for Discharge     Problem: BIRTH - VAGINAL/ SECTION  Goal: Fetal and maternal status remain reassuring during the birth process  Description: INTERVENTIONS:  - Monitor vital signs  - Monitor fetal heart rate  - Monitor uterine activity  - Monitor labor progression (vaginal delivery)  - DVT prophylaxis  - Antibiotic prophylaxis  Outcome: Adequate for Discharge  Goal: Emotionally satisfying birthing experience for mother/fetus  Description: Interventions:  - Assess, plan, implement and evaluate the nursing care given to the patient in labor  - Advocate the philosophy that each childbirth experience is a unique experience and support the family's chosen level of involvement and control during the labor process   - Actively participate in both the patient's and family's teaching of the birth process  - Consider cultural, Zoroastrian and age-specific factors and plan care for the patient in labor  Outcome: Adequate for Discharge     Problem: POSTPARTUM  Goal: Experiences normal postpartum course  Description: INTERVENTIONS:  - Monitor maternal vital signs  - Assess uterine involution and lochia  Outcome: Adequate for Discharge  Goal: Appropriate maternal -  bonding  Description: INTERVENTIONS:  - Identify family support  - Assess for appropriate maternal/infant bonding   -Encourage maternal/infant bonding opportunities  - Referral to  or  as needed  Outcome: Adequate for Discharge  Goal: Establishment of infant feeding pattern  Description: INTERVENTIONS:  - Assess breast/bottle feeding  - Refer to lactation as needed  Outcome: Adequate for Discharge  Goal: Incision(s), wounds(s) or drain site(s) healing without S/S of infection  Description: INTERVENTIONS  - Assess and document dressing, incision, wound bed, drain sites and surrounding tissue  - Provide patient and family education  - Perform skin care/dressing changes every   Outcome: Adequate for Discharge

## 2023-06-25 NOTE — OB LABOR/OXYTOCIN SAFETY PROGRESS
Oxytocin Safety Progress Check Note - Levonia Standard 21 y o  female MRN: [id number]    Unit/Bed#: L&D 322-01 Encounter: [id number]    Dose (mert-units/min) Oxytocin: 20 mert-units/min  Contraction Frequency (minutes): 2-4  Contraction Quality: Strong  Tachysystole: No   Cervical Dilation: 5        Cervical Effacement: 90  Fetal Station: 1  Baseline Rate: 135 bpm  Fetal Heart Rate: 130 BPM  FHR Category: Category I  Oxytocin Safety Progress Check: Safety check completed            Vital Signs:   Vitals:    06/25/23 1154   BP: 122/66   Pulse: 104   Resp:    Temp:        Notes/comments:   FHT category 1  Pitocin 20 mU/min  Continue titration as tolerated          Filiberto Abbott MD 6/25/2023 1:00 PM

## 2023-06-25 NOTE — OB LABOR/OXYTOCIN SAFETY PROGRESS
Oxytocin Safety Progress Check Note - See Garcia 21 y o  female MRN: [id number]    Unit/Bed#: L&D 322-01 Encounter: [id number]    Dose (mert-units/min) Oxytocin: 14 mert-units/min (per Dr Tr Stewart)  Contraction Frequency (minutes): 1-2  Contraction Quality: Strong  Tachysystole: No   Cervical Dilation: 6        Cervical Effacement: 90  Fetal Station: 0  Baseline Rate: 145 bpm  Fetal Heart Rate: 130 BPM  FHR Category: Category II  Oxytocin Safety Progress Check: Safety check completed            Vital Signs:   Vitals:    06/24/23 2324   BP: 123/65   Pulse: 96   Resp:    Temp:        Notes/comments: Patient rechecked due to some recurrent late decelerations, found to be slightly more dilated however fetal head is very low in station, will turn down Pitocin and continue with maternal repositioning        Vanessa Evangelista MD 6/25/2023 12:11 AM

## 2023-06-25 NOTE — DISCHARGE INSTRUCTIONS
Vaginal Delivery   WHAT YOU SHOULD KNOW:   A vaginal delivery is the birth of your baby through your vagina (birth canal)  AFTER YOU LEAVE:   Medicines:  NSAIDs  help decrease swelling and pain or fever  This medicine is available with or without a doctor's order  NSAIDs can cause stomach bleeding or kidney problems in certain people  If you take blood thinner medicine, always ask your healthcare provider if NSAIDs are safe for you  Always read the medicine label and follow directions  Take your medicine as directed  Call your healthcare provider if you think your medicine is not helping or if you have side effects  Tell him if you are allergic to any medicine  Keep a list of the medicines, vitamins, and herbs you take  Include the amounts, and when and why you take them  Bring the list or the pill bottles to follow-up visits  Carry your medicine list with you in case of an emergency  Follow up with your primary healthcare provider:  Most women need to return 6 weeks after a vaginal delivery  Ask about how to care for your wounds or stitches  Write down your questions so you remember to ask them during your visits  Activity:  Rest as much as possible  Try to keep all activities short  You may be able to do some exercise soon after you have your baby  Talk with your primary healthcare provider before you start exercising  If you work outside the home, ask when you can return to your job  Kegel exercises:  Kegel exercises may help your vaginal and rectal muscles heal faster  You can do Kegel exercises by tightening and relaxing the muscles around your vagina  Kegel exercises help make the muscles stronger  Breast care:  When your milk comes in, your breasts may feel full and hard  Ask how to care for your breasts, even if you are not breastfeeding  Constipation:  Do not try to push the bowel movement out if it is too hard   High-fiber foods, extra liquids, and regular exercise can help you prevent constipation  Examples of high-fiber foods are fruit and bran  Prune juice and water are good liquids to drink  Regular exercise helps your digestive system work  You may also be told to take over-the-counter fiber and stool softener medicines  Take these items as directed  Hemorrhoids:  Pregnancy can cause severe hemorrhoids  You may have rectal pain because of the hemorrhoids  Ask how to prevent or treat hemorrhoids  Perineum care: Your perineum is the area between your vagina and anus  Keep the area clean and dry to help it heal and to prevent infection  Wash the area gently with soap and water when you bathe or shower  Rinse your perineum with warm water when you use the toilet  Your primary healthcare provider may suggest you use a warm sitz bath to help decrease pain  A sitz bath is a bathtub or basin filled to hip level  Stay in the sitz bath for 20 to 30 minutes, or as directed  Vaginal discharge: You will have vaginal discharge, called lochia, after your delivery  The lochia is bright red the first day or two after the birth  By the fourth day, the amount decreases, and it turns red-brown  Use a sanitary pad rather than a tampon to prevent a vaginal infection  It is normal to have lochia up to 8 weeks after your baby is born  Monthly periods: Your period may start again within 7 to 12 weeks after your baby is born  If you are breastfeeding, it may take longer for your period to start again  You can still get pregnant again even though you do not have your monthly period  Talk with your primary healthcare provider about a birth control method that will be good for you if you do not want to get pregnant  Mood changes: Many new mothers have some kind of mood changes after delivery  Some of these changes occur because of lack of sleep, hormone changes, and caring for a new baby  Some mood changes can be more serious, such as postpartum depression   Talk with your primary healthcare provider if you feel unable to care for yourself or your baby  Sexual activity:  You may need to avoid sex for 6 to 7 weeks after you have your baby  You may notice you have a decreased desire for sex, or sex may be painful  You may need to use a vaginal lubricant (gel) to help make sex more comfortable  Contact your primary healthcare provider if:   You have heavy vaginal bleeding that fills 1 or more sanitary pads in 1 hour  You have a fever  Your pain does not go away, or gets worse  The skin between your vagina and rectum is swollen, warm, or red  You have swollen, hard, or painful breasts  You feel very sad or depressed  You feel more tired than usual      You have questions or concerns about your condition or care  Seek care immediately or call 911 if:   You have pus or yellow drainage coming from your vagina or wound  You are urinating very little, or not at all  Your arm or leg feels warm, tender, and painful  It may look swollen and red  You feel lightheaded, have sudden and worsening chest pain, or trouble breathing  You may have more pain when you take deep breaths or cough, or you may cough up blood  © 2014 [address] is for End User's use only and may not be sold, redistributed or otherwise used for commercial purposes  All illustrations and images included in CareNotes® are the copyrighted property of [employer]  or [employer]ame[employer]  The above information is an  only  It is not intended as medical advice for individual conditions or treatments  Talk to your doctor, nurse or pharmacist before following any medical regimen to see if it is safe and effective for you  Postpartum Perineal Care   WHAT YOU NEED TO KNOW:   Postpartum perineal care is care for your perineum after you have a baby  The perineum is your vagina and anus     DISCHARGE INSTRUCTIONS:   Care for your perineum:  Healthcare providers will give you a small squirt bottle and show you how to use it  Do the following after you use the toilet and before you put on a new pad:  Remove the soiled pad    Use the squirt bottle to rinse your perineum from front to back while you sit on the toilet     Pat the area dry from front to back with toilet paper or a cotton cloth     Put on a fresh pad    Wash your hands  Decrease pain:  Ask your healthcare provider about these and other ways to decrease perineal pain:  Sitz baths:  Healthcare providers may give you a portable sitz bath  This is a small tub that fits in the toilet  Fill the sitz bath or bathtub with 4 to 6 inches of warm water  Sit in the warm water for 20 minutes 2 to 3 times a day  Ice:  Ice helps decrease swelling and pain  Ice may also help prevent tissue damage  Use an ice pack, or put crushed ice in a plastic bag  Cover it with a towel and place it on your perineum for 15 to 20 minutes every hour, or as directed  Medicine spray, wipes, or pads:  Healthcare providers may give you a medicine spray or wipes soaked with numbing medicine to decrease the pain  Pads that contain an herb called witch hazel may also help reduce pain  Use these after perineal care or a sitz bath  Follow up with your healthcare provider as directed:  Write down your questions so you remember to ask them during your visits  Contact your healthcare provider if:   You have heavy vaginal bleeding that fills 1 or more sanitary pads in 1 hour  You have foul-smelling vaginal discharge  You feel weak or lightheaded  You have questions or concerns about your condition or care  Seek care immediately or call 911 if:   You have large blood clots or bright red blood coming from your vagina  You have abdominal pain, vomiting, and a fever  © 2017 [address]  Information is for End User's use only and may not be sold, redistributed or otherwise used for commercial purposes   All illustrations and images included in [facility] are the copyrighted property of A D A M , Inc  or Otilio Tamez  The above information is an  only  It is not intended as medical advice for individual conditions or treatments  Talk to your doctor, nurse or pharmacist before following any medical regimen to see if it is safe and effective for you  Postpartum Depression   WHAT YOU NEED TO KNOW:   What is postpartum depression? Postpartum depression is a mood disorder that occurs after giving birth  A mood is an emotion or a feeling  Moods affect your behavior and how you feel about yourself and life in general  Depression is a sad mood that you cannot control  Women often feel sad, afraid, or nervous after their baby is born  These feelings are called postpartum blues or baby blues, and they usually go away in 1 to 2 weeks  With postpartum depression, these symptoms get worse and continue for more than 2 weeks  Postpartum depression is a serious condition that affects your daily activities and relationships  What causes postpartum depression? Healthcare providers do not know exactly what causes postpartum depression  It may be caused by a sudden drop in hormone levels after childbirth  A previous episode of postpartum depression or a family history of depression may increase your risk  Several things may trigger postpartum depression:  Lack of support from the baby's father or other family members    Feeling more tired than usual    Stress, a poor diet, or lack of sleep    Pain after childbirth or pain during breastfeeding    Sudden change in lifestyle  How is postpartum depression diagnosed? Postpartum depression affects your daily activities and your relationships with other people  Healthcare providers will ask you questions about your signs and symptoms and how they are affecting your life  The symptoms of postpartum depression usually begin within 1 month after childbirth   You feel depressed or lose interest in activities you enjoy nearly every day for at least 2 weeks  You also have 4 or more of the following symptoms: You feel tired or have less energy than usual      You feel unimportant or guilty most of the time  You think about hurting or killing yourself  Your appetite changes  You may lose your appetite and lose weight without trying  Your appetite may also increase and you may gain weight  You are restless, irritable, or withdrawn  You have trouble concentrating and remembering things  You have trouble doing daily tasks or making decisions  You have trouble sleeping, even after the baby is asleep  How is postpartum depression treated? Psychotherapy:  During therapy, you will talk with healthcare providers about how to cope with your feelings and moods  This can be done alone or in a group  It may also be done with family members or your partner  Antidepressants: This medicine is given to decrease or stop the symptoms of depression  You usually need to take antidepressants for several weeks before you begin to feel better  Do not stop taking antidepressants unless your healthcare provider tells you to  Healthcare providers may try a different antidepressant if one type does not work  What can I do to feel better? Rest:  Do not try to do everything all at the same time  Do only what is needed and let other things wait until later  Ask your family or friends for help, especially if you have other children  Ask your partner to help with night feedings or other baby care  Try to sleep when the baby naps  Get emotional support:  Share your feelings with your partner, a friend, or another mother  Take care of yourself:  Shower and dress each day  Do not skip meals  Try to get out of the house a little each day  Get regular exercise  Eat a healthy diet  Avoid alcohol because it can make your depression worse  Do not isolate yourself  Go for a walk or meet with a friend   It is also important that you have some time by yourself each day  How do I find support and more information? [address] [facility] [place], Public Information & Communication Branch  [address], [address], [address]  [place] [address]   Phone: [phone]  Phone: [phone]  Web Address: [facility]  When should I contact my healthcare provider? You cannot make it to your next visit  Your depression does not get better with treatment or it gets worse  You have questions or concerns about your condition or care  When should I seek immediate care or call 911? You think about hurting or killing yourself, your baby, or someone else  You feel like other people want to hurt you  You hear voices telling you to hurt yourself or your baby  CARE AGREEMENT:   You have the right to help plan your care  Learn about your health condition and how it may be treated  Discuss treatment options with your caregivers to decide what care you want to receive  You always have the right to refuse treatment  The above information is an  only  It is not intended as medical advice for individual conditions or treatments  Talk to your doctor, nurse or pharmacist before following any medical regimen to see if it is safe and effective for you  ©  2600 [facility] Information is for End User's use only and may not be sold, redistributed or otherwise used for commercial purposes  All illustrations and images included in CareNotes® are the copyrighted property of [employer]  or Otilio Tamez  Postpartum Bleeding   WHAT YOU NEED TO KNOW:   Postpartum bleeding is vaginal bleeding after childbirth  This bleeding is normal, whether your baby was born vaginally or by   It contains blood and the tissue that lined the inside of your uterus when you were pregnant     DISCHARGE INSTRUCTIONS:   What to expect with postpartum bleeding:  Postpartum bleeding usually lasts at least 10 days, and may last longer than 6 weeks  Your bleeding may range from light (barely staining a pad) to heavy (soaking a pad in 1 hour)  Usually, you have heavier bleeding right after childbirth, which slows over the next few weeks until it stops  The bleeding is red or dark brown with clots for the first 1 to 3 days  It then turns pink for several days, and then becomes a white or yellow discharge until it ends  Follow up with your obstetrician as directed:  Do not have sex until your obstetrician says it is okay  Write down your questions so you remember to ask them during your visits  Contact your healthcare provider or obstetrician if:   Your bleeding increases, or you have heavy bleeding that soaks a pad in 1 hour for 2 hours in a row  You pass large blood clots  You are breathing faster than normal, or your heart is beating faster than normal     You are urinating less than usual, or not at all  You feel dizzy  You have questions or concerns about your condition or care  Seek immediate care or call 911 if:   You are suddenly short of breath and feel lightheaded  You have sudden chest pain  © 2017 [address]  Information is for End User's use only and may not be sold, redistributed or otherwise used for commercial purposes  All illustrations and images included in CareNotes® are the copyrighted property of [employer]  or Otilio Tamez  The above information is an  only  It is not intended as medical advice for individual conditions or treatments  Talk to your doctor, nurse or pharmacist before following any medical regimen to see if it is safe and effective for you  Breast Care for the Breast Feeding Mother   WHAT YOU SHOULD KNOW:   Your breasts will go through normal changes while you are breastfeeding  Sometimes breast and nipple problems can develop while you are breastfeeding   Learn about changes that are normal and those that may be a problem  Breast care can help you prevent and manage problems so you and your baby can enjoy the benefits of breastfeeding  AFTER YOU LEAVE:   Breast changes while you are breastfeeding: For the first few days after your baby is born, your body makes a small amount of breast milk (colostrum)  Within about 2 to 5 days, your body will begin making mature milk  It may take up to 10 days or longer for mature milk to come in  When your mature milk comes in, your breasts will become full and firm  They may feel tender  Breastfeeding your baby will decrease the full feeling in your breasts  You may feel a tingly sensation during feedings as milk is released from your breasts  This is called the milk let-down reflex  After 7 or more days, the fullness may feel like it has decreased  Your nipples should look the same as they did before you started breastfeeding  Breasts that feel full before and empty after breastfeeding are signs that breastfeeding is going well  Breast problems that can occur while you are breastfeeding:   Nipple soreness  may occur when you begin to breastfeed your baby  You may also have nipple soreness if your baby is not latched on to your breast correctly  Correct positioning and latch-on may decrease or stop the pain in your nipples  Work with your caregivers to help your baby latch on correctly  It may also be helpful to place warm, wet compresses on your nipples to help decrease pain  Plugged milk ducts  may cause painful breast lumps  Plugged ducts may be caused by not emptying your breasts completely during feedings  When your baby pauses during breastfeeding, massage and gently squeeze your breast  Gentle massage may unplug a blocked milk duct  Pump out any milk left in your breasts after your baby is done breastfeeding  Avoid wearing tight tops, tight bras, or under-wire bras, because they may put pressure on your breasts      Engorgement  may occur as your milk comes in soon after you begin breastfeeding  Engorgement may cause your breasts to become swollen and painful  Your breasts may also become engorged if you miss a feeding or you do not breastfeed on demand  The best way to decrease engorgement symptoms is to empty your breasts by feeding your baby often  Engorgement can make it hard for your baby to latch on to your breast  If this happens, express a small amount of milk and then have your baby latch on  Cold compresses, gel packs, or ice packs on your breasts can help decrease pain and swelling  Ask your caregiver how often and how long you should use cold, or ice packs  A breast infection called mastitis  can develop if you have plugged milk ducts or engorgement  Mastitis causes your breasts to become red, swollen, and painful  You may also have flu-like symptoms, such as chills and a fever  Place heat on your breasts to help decrease the pain  You may want to place a moist, warm cloth on the painful breast or both of your breasts  Ask how often to do this  Your primary healthcare provider [facility]) may suggest that you take an NSAID, such as ibuprofen, to decrease pain and swelling  He may also order antibiotics to treat mastitis  Ask about feeding your baby when you have a breast infection  How to help prevent or manage breast problems while you are breastfeeding:   Learn how to position your baby and latch him on correctly  To latch your baby correctly to your breast, make sure that his mouth covers most of your areola (dark area around your nipple)  He should not be attached only to the nipple  Your baby is latched on well if you feel comfortable and do not feel pain  A correct latch helps him get enough milk and can help to prevent sore nipples and other breast problems  There are several breastfeeding positions that you can try  Find the position that works best for you and your baby   Ask your caregiver for more information about how to hold and breastfeed your baby  Prevent biting  Your baby may get teeth at about 1to 3months of age  To help prevent biting, break his suction once he is finished or if he has fallen asleep  To break his suction, slip a finger into the side of his mouth  If your baby bites you, respond with surprise or unhappiness  Offer praise when he does not bite you  Breastfeed your baby regularly  Feed your baby 8 to 12 times a day  You may need to wake up your baby at night to feed him  It is okay to feed from 1 or both breasts at each feeding  Your baby should breastfeed from both breasts equally over the course of a day  If your baby only feeds from 1 side during a feeding, offer your other breast to him first for the next feeding  Schedule and keep follow-up visits  Talk to your baby's pediatrician or your PHP during follow-up visits if you have breast problems  Caregivers may suggest that you, or you and your partner, attend classes on breastfeeding  You also may want to join a breastfeeding support group  Caregivers may suggest that you see a lactation consultant  This is a caregiver who can help you with breastfeeding  Contact your PHP if:   You have a fever and chills  You have body aches and you feel like you do not have any energy  One or both of your breasts is red, swollen or hard, painful, and feels warm or hot  You have breast engorgement that does not get better within 24 hours  You see or feel a lump in your breast that hurts when you touch it  You have nipple pain during breastfeeding or between feedings  Your nipples are red, dry, cracked, or bleeding, or they have scabs on them  You have questions or concerns about your condition or care  © 2014 [address] is for End User's use only and may not be sold, redistributed or otherwise used for commercial purposes   All illustrations and images included in CareNotes® are the copyrighted property of A  D A M , Inc  or [facility]  The above information is an  only  It is not intended as medical advice for individual conditions or treatments  Talk to your doctor, nurse or pharmacist before following any medical regimen to see if it is safe and effective for you

## 2023-06-25 NOTE — OB LABOR/OXYTOCIN SAFETY PROGRESS
Oxytocin Safety Progress Check Note - Marquita Fay 21 y o  female MRN: [id number]    Unit/Bed#: L&D 322-01 Encounter: [id number]    Dose (mert-units/min) Oxytocin: 16 mert-units/min  Contraction Frequency (minutes): 2-3  Contraction Quality: Strong  Tachysystole: No   Cervical Dilation: 5        Cervical Effacement: 90  Fetal Station: 1  Baseline Rate: 130 bpm  Fetal Heart Rate: 130 BPM  FHR Category: Category I  Oxytocin Safety Progress Check: Safety check completed            Vital Signs:   Vitals:    06/25/23 1009   BP: 142/74   Pulse: (!) 112   Resp:    Temp:        Notes/comments:   Patient feeling more pressure  SVE 5/90/+1  Although significant caput edema, bony prominence appreciated with further descent  Tight cervical band has improved, cervix can be stretched to 7cm dilation  Maintain throne position  FHT category 1  Continue pitocin titration as tolerated          Abigail Joyner MD 6/25/2023 10:22 AM

## 2023-06-25 NOTE — ANESTHESIA POSTPROCEDURE EVALUATION
"Post-Op Assessment Note    CV Status:  Stable  Pain Score: 0    Pain management: adequate     Mental Status:  Alert and awake   Hydration Status:  Euvolemic and stable   PONV Controlled:  Controlled   Airway Patency:  Patent      Post Op Vitals Reviewed: Yes      Staff: Anesthesiologist     Post-op block assessment: site cleaned, catheter intact and no complications      No notable events documented      BP      Temp      Pulse     Resp      SpO2      /66   Pulse 92   Temp 97 9 °F (36 6 °C) (Oral)   Resp 16   Ht 5' 2\" (1 575 m)   Wt 86 1 kg (189 lb 12 8 oz)   LMP 09/10/2022 (Exact Date)   Breastfeeding Unknown   BMI 34 71 kg/m²     "

## 2023-06-25 NOTE — OB LABOR/OXYTOCIN SAFETY PROGRESS
Oxytocin Safety Progress Check Note - Marolyn Agent 21 y o  female MRN: [id number]    Unit/Bed#: L&D 322-01 Encounter: [id number]    Dose (mert-units/min) Oxytocin: 20 mert-units/min  Contraction Frequency (minutes): 1-2  Contraction Quality: Moderate  Tachysystole: No   Cervical Dilation: 5-6        Cervical Effacement: 70  Fetal Station: -2  Baseline Rate: 140 bpm  Fetal Heart Rate: 130 BPM  FHR Category: Category I  Oxytocin Safety Progress Check: Safety check completed            Vital Signs:   Vitals:    06/24/23 1930   BP:    Pulse:    Resp:    Temp: 98 2 °F (36 8 °C)       Notes/comments: SVE minimally changed, some cervical effacement noted, patient now requesting epidural, fetal heart tracing has been category 1, will recheck once comfortable        Gavino Smith MD 6/24/2023 9:04 PM

## 2023-06-25 NOTE — OB LABOR/OXYTOCIN SAFETY PROGRESS
Oxytocin Safety Progress Check Note - Christina Lipscomb 21 y o  female MRN: [id number]    Unit/Bed#: L&D 322-01 Encounter: [id number]    Dose (mert-units/min) Oxytocin: 4 mert-units/min  Contraction Frequency (minutes): 5-6  Contraction Quality: Strong  Tachysystole: No   Cervical Dilation: 6        Cervical Effacement: 90  Fetal Station: 0  Baseline Rate: 130 bpm  Fetal Heart Rate: 130 BPM  FHR Category: Category I  Oxytocin Safety Progress Check: Safety check completed            Vital Signs:   Vitals:    06/25/23 0624   BP: 136/77   Pulse: (!) 106   Resp:    Temp:        Notes/comments:   SVE deferred  S/p pitocin break  Ctx q5-6m, pitocin 4 mU/min  FHT category 1  Continue titration as tolerated          Yue Kidd MD 6/25/2023 6:47 AM

## 2023-06-25 NOTE — ANESTHESIA PROCEDURE NOTES
Epidural Block    Patient location during procedure: OB  Start time: 6/24/2023 9:08 PM  Reason for block: primary anesthetic  Staffing  Performed by: Irena Angelo DO  Authorized by: Irena Angelo DO    Preanesthetic Checklist  Completed: patient identified, IV checked, site marked, risks and benefits discussed, surgical consent, monitors and equipment checked, pre-op evaluation and timeout performed  Epidural  Patient position: sitting  Prep: Betadine  Patient monitoring: heart rate, continuous pulse ox and frequent blood pressure checks  Approach: midline  Location: lumbar  Injection technique: KRISS saline  Needle  Needle type: Tuohy   Needle gauge: 18 G  Catheter type: side hole  Catheter size: 20 G  Catheter at skin depth: 10 5 cm  Catheter securement method: surgical tape  Test dose: negative  Assessment  Number of attempts: 1negative aspiration for CSF, negative aspiration for heme and no paresthesia on injection  patient tolerated the procedure well with no immediate complications

## 2023-06-25 NOTE — OB LABOR/OXYTOCIN SAFETY PROGRESS
Oxytocin Safety Progress Check Note - Tessa Rodriguez 21 y o  female MRN: [id number]    Unit/Bed#: L&D 322-01 Encounter: [id number]    Dose (mert-units/min) Oxytocin: 22 mert-units/min  Contraction Frequency (minutes): 2-3  Contraction Quality: Strong  Tachysystole: No   Cervical Dilation: 10  Dilation Complete Date: 06/25/23  Dilation Complete Time: 1412  Cervical Effacement: 100  Fetal Station: 2  Baseline Rate: 120 bpm  Fetal Heart Rate: 130 BPM  FHR Category: Category I  Oxytocin Safety Progress Check: Safety check completed            Vital Signs:   Vitals:    06/25/23 1324   BP: 130/81   Pulse: 96   Resp:    Temp:        Notes/comments:   SVE 10/100/+2  FHT category 1  Position occiput transverse, asynclitic to maternal right  Plan to start pushing  D/w Dr Chaitanya Cheatham MD 6/25/2023 2:13 PM

## 2023-06-25 NOTE — OB LABOR/OXYTOCIN SAFETY PROGRESS
Oxytocin Safety Progress Check Note - Adriana Medina 21 y o  female MRN: [id number]    Unit/Bed#: L&D 322-01 Encounter: [id number]    Dose (mert-units/min) Oxytocin: 16 mert-units/min  Contraction Frequency (minutes): 2  Contraction Quality: Strong  Tachysystole: No   Cervical Dilation: 6        Cervical Effacement: 90  Fetal Station: 0  Baseline Rate: 150 bpm  Fetal Heart Rate: 130 BPM  FHR Category: Category I  Oxytocin Safety Progress Check: Safety check completed            Vital Signs:   Vitals:    06/25/23 0301   BP:    Pulse:    Resp:    Temp: 98 6 °F (37 °C)       Notes/comments: SVE unchanged, IUPC and FSE placed for monitoring, patient has been on Pitocin for 24 hours so will likely give Pit break and small snack once able to establish MVUs with the IUPC, fetal heart tracing continues to be category 1, will plan to recheck about 2 hours after Pitocin is restarted    Brittany Soto MD 6/25/2023 3:57 AM

## 2023-06-25 NOTE — L&D DELIVERY NOTE
Vaginal Delivery Summary - OB/GYN   Sharmin Early 21 y o  female MRN: [id number]  Unit/Bed#: L&D 322-01 Encounter: [id number]      Pre-delivery Diagnosis:   1  Pregnancy at 39w0d   2  Anemia in pregnancy  3  Elevated BMI  4  GBS positive status  5  Gestational hypertension    Post-delivery Diagnosis: same, delivered    Procedure: Spontaneous Vaginal Delivery    Attending: Dr Migdalia Truong    Assistant(s): Dr Edilberto Magana    Anesthesia: Epidural    QBL: 31XI    Complications: none apparent    Specimens:   1  Arterial and venous cord gases  2  Cord blood  3  Segment of umbilical cord  4  Placenta to storage     Findings:  1  Viable female on 2023 at 1448, with APGARS of 8 and 9 at 1 and 5 minutes respectively,  2  Spontaneous delivery of intact placenta at 1450  3  Right sulcal laceration repaired with 2-0 Vicryl rapide  4  Blood gases:  Umbilical Artery  Recent Labs     23  1450   PHCART 7 183*   BECART -00 0*       Umbilical Vein  Recent Labs     23  1450   PHCVEN 7 391   BECVEN -6 6*       Disposition:  Patient tolerated the procedure well and was recovering in labor and delivery room       Brief history and labor course:  Ms Sharmin Early is a 21 y o   at 39w0d  She presented to labor and delivery for elective induction of labor  Her pregnancy was complicated by gestational hypertension diagnosed on admission  Induction was started with Cytotec administration with Khanna balloon placement and Pitocin to follow  Spontaneous rupture of membranes was appreciated with clear fluid and the patient received an epidural for analgesia  Patient went through 2-day labor course with cervical ripening until reaching the active phase of labor  Patient then made steady cervical change to complete and began pushing  Description of procedure:  After pushing for 36 minutes, at 1448 patient delivered a viable female , wt pending, apgars of 8 (1 min) and 9 (5 min)   The fetal vertex delivered spontaneously  There was a tight nuchal cord that was atraumatically delivered through  The left anterior shoulder delivered atraumatically with maternal expulsive forces and the assistance of gentle downward traction  The right posterior shoulder delivered with maternal expulsive forces and the assistance of gentle upward traction  The remainder of the fetus delivered spontaneously  Upon delivery, the infant was placed on the mothers abdomen and the cord was doubly clamped and cut after >30 seconds  The infant was noted to cry spontaneously and was moving all extremities appropriately  There was no evidence for injury  Awaiting nurse resuscitators evaluated the   Arterial and venous cord blood gases and cord blood was collected for analysis  These were promptly sent to the lab  In the immediate post-partum, 30 units of IV pitocin was administered, and the uterus was noted to contract down well with massage and pitocin  The placenta delivered spontaneously at 1450 and was noted to have a centrally inserted 3 vessel cord  The vagina, cervix, perineum, and rectum were inspected and there was noted to be a right sulcal laceration  Repair was completed with 2-0 Vicryl rapide in a running locked fashion  Excellent hemostasis was appreciated after completion of repair  At the conclusion of the procedure, all needle, sponge, and instrument counts were noted to be correct  Patient tolerated the procedure well and was allowed to recover in labor and delivery room with family and  before being transferred to the post-partum floor  Dr Nilay Hussein was present and participated in all key portions of the case        Adrian Van MD  OB/GYN PGY-2  2023  3:20 PM

## 2023-06-25 NOTE — OB LABOR/OXYTOCIN SAFETY PROGRESS
Oxytocin Safety Progress Check Note - Kina Campbell 21 y o  female MRN: [id number]    Unit/Bed#: L&D 322-01 Encounter: [id number]    Dose (mert-units/min) Oxytocin: 12 mert-units/min  Contraction Frequency (minutes): 3-4  Contraction Quality: Strong  Tachysystole: No   Cervical Dilation: 5        Cervical Effacement: 90  Fetal Station: 0  Baseline Rate: 135 bpm  Fetal Heart Rate: 130 BPM  FHR Category: Category I  Oxytocin Safety Progress Check: Safety check completed            Vital Signs:   Vitals:    06/25/23 0811   BP: 123/66   Pulse: (!) 107   Resp:    Temp: 99 6 °F (37 6 °C)       Notes/comments:   Provider taking over care for change of shift  SVE 5/90/0, tight cervical band but well-effaced  FHT category 1 with intermittent early decelerations  Pitocin increased to 14 mU/min, MVUs inadequate  Continue titration as tolerated  Recommend throne position for encouraged fetal descent          Gerardo Mensah MD 6/25/2023 9:06 AM

## 2023-06-26 PROCEDURE — 99024 POSTOP FOLLOW-UP VISIT: CPT | Performed by: OBSTETRICS & GYNECOLOGY

## 2023-06-26 RX ORDER — LIDOCAINE HYDROCHLORIDE 10 MG/ML
2 INJECTION, SOLUTION EPIDURAL; INFILTRATION; INTRACAUDAL; PERINEURAL ONCE
Status: COMPLETED | OUTPATIENT
Start: 2023-06-26 | End: 2023-06-27

## 2023-06-26 RX ADMIN — DOCUSATE SODIUM 100 MG: 100 CAPSULE, LIQUID FILLED ORAL at 18:17

## 2023-06-26 RX ADMIN — IBUPROFEN 600 MG: 600 TABLET, FILM COATED ORAL at 18:17

## 2023-06-26 RX ADMIN — DOCUSATE SODIUM 100 MG: 100 CAPSULE, LIQUID FILLED ORAL at 08:18

## 2023-06-26 RX ADMIN — IBUPROFEN 600 MG: 600 TABLET, FILM COATED ORAL at 00:35

## 2023-06-26 RX ADMIN — FERROUS SULFATE TAB 325 MG (65 MG ELEMENTAL FE) 325 MG: 325 (65 FE) TAB at 08:18

## 2023-06-26 RX ADMIN — IBUPROFEN 600 MG: 600 TABLET, FILM COATED ORAL at 11:40

## 2023-06-26 RX ADMIN — IBUPROFEN 600 MG: 600 TABLET, FILM COATED ORAL at 05:58

## 2023-06-26 NOTE — ASSESSMENT & PLAN NOTE
Pain well-controlled  Voiding spontaneously, passing flatus  Encouraged breastfeeding  Encouraged ambulation  Contraception: Nexplanon to be placed

## 2023-06-26 NOTE — ASSESSMENT & PLAN NOTE
Systolic (75EBA), VWJ:711 , Min:115 , YMR:471     Diastolic (30ABH), DSM:35, Min:61, Max:88    Asymptomatic  Continue to trend pressures per protocol

## 2023-06-26 NOTE — LACTATION NOTE
This note was copied from a baby's chart  CONSULT - LACTATION  Baby Girl Janeth Gastelum 1 days female MRN: [id number]    [address] [facility] NURSERY Room / Bed: L&D 305(N)/L&D 305(N) Encounter: [id number]    Maternal Information     MOTHER:  Teodora Gastelum  Maternal Age: 21 y o    OB History: # 1 - Date: 23, Sex: Female, Weight: 3060 g (6 lb 11 9 oz), GA: 39w2d, Delivery: Vaginal, Spontaneous, Apgar1: 8, Apgar5: 9, Living: Living, Birth Comments: None   Previouse breast reduction surgery? No    Lactation history:   Has patient previously breast fed: No   How long had patient previously breast fed:     Previous breast feeding complications:     History reviewed  No pertinent surgical history  Birth information:  YOB: 2025   Time of birth: 2:48 PM   Sex: female   Delivery type: Vaginal, Spontaneous   Birth Weight: 3060 g (6 lb 11 9 oz)   Percent of Weight Change: -1%     Gestational Age: 44w2d   [unfilled]    Assessment     Breast and nipple assessment: normal assessment    [place] Assessment: normal assessment    Feeding assessment: feeding well  LATCH:  Latch: Grasps breast, tongue down, lips flanged, rhythmic sucking   Audible Swallowing: Spontaneous and intermittent (24 hours old)   Type of Nipple: Everted (After stimulation)   Comfort (Breast/Nipple): Soft/non-tender   Hold (Positioning): Full assist, staff holds infant at breast   LATCH Score: 8         Having latch problems? No   Position(s) Used [employer]; Football;Laid Back   Breasts/Nipples   Left Breast Soft   Right Breast Soft   Left Nipple Everted   Right Nipple Everted   Intervention Hand expression  (effective independently )   Breastfeeding Status Yes   Breastfeeding Progress Not yet established   Breast Pump   Pump 3  (ordered outpatient)   Patient Follow-Up   Lactation Consult Status 2   Follow-Up Type Inpatient;Call as needed   Other OB Lactation Documentation    Additional Problem Noted parents have been giving formula  HE is effective  Baby can latch well with good alignment  (Reviewed RSB  D/C booklet at bedside (Sinhala copies per Teodora's request))       Feeding recommendations:  breast feed on demand     Discussed risks for early supplementation: over feeding, longer digestion times, engorgement for mom, lower milk supply for mom, and nipple confusion  Benefits of breast feeding for infant's intestinal tract, less engorgement for mom, protection from multiple disease processes as infant develops, avoidance of over feeding for infant, less nipple confusion, and increased health benefits for mom  Information on hand expression given  Discussed benefits of knowing how to manually express breast including stimulating milk supply, softening nipple for latch and evacuating breast in the event of engorgement  Reviewed how to bring baby to the breast so that her lower lip and chin touch the breast with her nose just above the nipple to encourage a wider, more asymmetric latch  Met with mother  Provided mother with Ready, Set, Baby booklet which contained information on:  Hand expression with access to QR codes to review hand expression  Positioning and latch reviewed as well as showing images of other feeding positions  Discussed the properties of a good latch in any position  Feeding on cue and what that means for recognizing infant's hunger, s/s that baby is getting enough milk and some s/s that breastfeeding dyad may need further help  Skin to Skin contact an benefits to mom and baby  Avoidance of pacifiers for the first month discussed  Gave information on common concerns, what to expect the first few weeks after delivery, preparing for other caregivers, and how partners can help  Resources for support also provided  Encouraged parents to call for assistance, questions, and concerns about breastfeeding  Extension provided        Lisbeth Harrison RN 6/26/2023 4:36 PM

## 2023-06-26 NOTE — PROGRESS NOTES
"Progress Note - OB/GYN  Mohan Christensen 21 y o  female MRN: [id number]  Unit/Bed#: L&D 305-01 Encounter: [id number]    Assessment and Plan     Mohan Christensen is a patient of: [address]  She is PPD# 1 s/p  spontaneous vaginal delivery  Recovering well and is stable       Gestational hypertension, third trimester  Assessment & Plan  Systolic (13OWF), TLX:861 , Min:102 , TTW:390     Diastolic (91OAN), QTO:57, Min:57, Max:91    Asymptomatic  Continue to trend pressures per protocol    Positive GBS test  Assessment & Plan  S/p PCN gtt for intrapartum management    Anemia affecting pregnancy in third trimester  Assessment & Plan  Prior to admission hemoglobin 10 5  Hgb 11 6 on admission    Obesity (BMI 30 0-34  9)  Assessment & Plan  BMI 34    *  (spontaneous vaginal delivery)  Assessment & Plan  Pain well-controlled  Voiding spontaneously, passing flatus  Encouraged breastfeeding  Encouraged ambulation  Contraception: Nexplanon to be placed      Disposition    - Anticipate discharge home on PPD# 1/2      Subjective/Objective     Chief Complaint: Postpartum State     Subjective:    Mohan Christensen is PPD/POD#1 s/p  spontaneous vaginal delivery  She has no current complaints  Pain is well controlled  Patient is currently voiding  She is ambulating  Patient is currently passing flatus and has had no bowel movement  She is tolerating PO, and denies nausea or vomitting  Patient denies fever, chills, chest pain, shortness of breath, or calf tenderness  Lochia is normal  She is  Breastfeeding and Bottle feeding  She is recovering well and is stable         Vitals:   /60 (BP Location: Left arm)   Pulse 87   Temp 98 6 °F (37 °C) (Oral)   Resp (!) 24 Comment: RN Notified  Ht 5' 2\" (1 575 m)   Wt 86 1 kg (189 lb 12 8 oz)   LMP 09/10/2022 (Exact Date)   SpO2 97%   Breastfeeding Yes   BMI 34 71 kg/m²       Intake/Output Summary (Last 24 hours) at 2023 4147  Last data " filed at 6/25/2023 2017  Gross per 24 hour   Intake --   Output 1669 ml   Net -1669 ml       Invasive Devices     Intrauterine Pressure Catheter  Duration           Intrauterine Pressure Catheter 06/25/23 0337 1 day                Physical Exam:   GEN: Susi Calabrese appears well, alert and oriented x 3, pleasant and cooperative   CARDIO: RRR, no murmurs or rubs  RESP:  CTAB, no wheezes or rales  ABDOMEN: soft, no tenderness, no distention, fundus @ 2cm below umbilicus  EXTREMITIES: SCDs on, non tender, no erythema, b/l Ssear's sign negative      Labs:     Hemoglobin   Date Value Ref Range Status   06/23/2023 11 6 11 5 - 15 4 g/dL Final   03/23/2023 10 5 (L) 11 5 - 15 4 g/dL Final     WBC   Date Value Ref Range Status   06/23/2023 9 69 4 31 - 10 16 Thousand/uL Final   03/23/2023 9 97 4 31 - 10 16 Thousand/uL Final     Platelets   Date Value Ref Range Status   06/23/2023 146 (L) 149 - 390 Thousands/uL Final   03/23/2023 174 149 - 390 Thousands/uL Final     Creatinine   Date Value Ref Range Status   06/23/2023 0 57 (L) 0 60 - 1 30 mg/dL Final     Comment:     Standardized to IDMS reference method   10/24/2022 0 56 (L) 0 60 - 1 20 mg/dL Final     Comment:     Standardized to IDMS reference method     AST   Date Value Ref Range Status   06/23/2023 12 (L) 13 - 39 U/L Final   10/24/2022 17 14 - 36 U/L Final     Comment:     Specimen collection should occur prior to Sulfasalazine administration due to the potential for falsely depressed results  ALT   Date Value Ref Range Status   06/23/2023 8 7 - 52 U/L Final     Comment:     Specimen collection should occur prior to Sulfasalazine administration due to the potential for falsely depressed results  10/24/2022 16 <35 U/L Final     Comment:     Specimen collection should occur prior to Sulfasalazine administration due to the potential for falsely depressed results             Marbella Osorio MD  6/26/2023  6:38 AM

## 2023-06-26 NOTE — UTILIZATION REVIEW
Initial Clinical Review    Admission: Date/Time/Statement:   Admission Orders (From admission, onward)     Ordered        23  Inpatient Admission  Once                      Orders Placed This Encounter   Procedures   • Inpatient Admission     Standing Status:   Standing     Number of Occurrences:   1     Order Specific Question:   Level of Care     Answer:   Med Surg [16]     Order Specific Question:   Estimated length of stay     Answer:   More than 2 Midnights     Order Specific Question:   Certification     Answer:   I certify that inpatient services are medically necessary for this patient for a duration of greater than two midnights  See H&P and MD Progress Notes for additional information about the patient's course of treatment  ED Arrival Information     Patient not seen in ED                     Chief Complaint   Patient presents with   • Scheduled Induction       Initial Presentation: 21 y o  female  at 39w0d Inpatient admission to triage due to  [place] 55    SVE: /-9 on , Cephalic confirmed by TAUS, GBS status: positive     IOL plan continuous fetal monitoring, cl liq diet, IVF, IV antibx; begin w PO Cytotec, (2) fam balloon,  IV Pit titration, SROM & AROM of forebag, epidural,     2023 Viable female  at 1, with APGARS of 8 and 9, birthwt= 3060 g (6 lb 11 9 oz)   Date: 2023   Day 2:   @ 4:24 AM  PROCEDURE:  FAM BALLOON PLACEMENT  @ 8:30 AM  second Fam balloon   Oxytocin: 6 mert-units/min (decreased per Dr Santiago Console prior to new fam balloon placement)  Contraction Frequency (minutes): 2-3  Contraction Quality: Mild  Cervical Dilation: 1-2  Cervical Effacement: 60  Fetal Station: -3  @ 1:45 PM  Oxytocin: 10 mert-units/min  Contraction Frequency (minutes): 2-3 5  Contraction Quality: Mild  Cervical Dilation: 5-6  Cervical Effacement: 80  Fetal Station: -3   2023 9:08 PM epidural   @ 3:39 PM  SROM 1 HR ago now w prominent forebag w AROM performed w more cl fluid  DATE  6/25/2023  DAY 3  @ 3:57 AM  IUPC and FSE placed for monitoring, patient has been on Pitocin for 24 hours so will likely give Pit break and small snack   Oxytocin: 16 mert-units/min  Contraction Frequency (minutes): 2  Contraction Quality: Strong  Cervical Dilation: 6  Cervical Effacement: 90  Fetal Station: 0  @ 1:00PM  Oxytocin: 20 mert-units/min  Contraction Frequency (minutes): 2-4  Contraction Quality: Strong  Tachysystole: No   Cervical Dilation: 5  Cervical Effacement: 90  Fetal Station: 1  @ 2:13 PM Oxytocin: 22 mert-units/min  Contraction Frequency (minutes): 2-3  Contraction Quality: Strong  Cervical Dilation: 10  Dilation Complete Date: 06/25/23  Dilation Complete Time: 1412  Cervical Effacement: 100  Fetal Station: 2    6/25/2023 Viable female at [address] [facility], with APGARS of 8 and 9, birthwt= 3060 g (6 lb 11 9 oz)    Triage Vitals   Temperature Pulse Respirations Blood Pressure SpO2   06/23/23 2031 06/23/23 2115 06/23/23 2031 06/23/23 2115 06/26/23 0316   98 6 °F (37 °C) 83 20 131/83 97 %      Temp Source Heart Rate Source Patient Position - Orthostatic VS BP Location FiO2 (%)   06/23/23 2031 06/23/23 2031 06/23/23 2031 06/23/23 2031 --   Oral Monitor Lying Right arm       Pain Score       06/23/23 2031       No Pain          Wt Readings from Last 1 Encounters:   06/23/23 86 1 kg (189 lb 12 8 oz) (96 %, Z= 1 78)*     * Growth percentiles are based on CDC (Girls, 2-20 Years) data       Additional Vital Signs:   06/26/23 0700 97 8 °F (36 6 °C) 80 20 129/79 98 % None (Room air) -- Lying   06/26/23 0316 98 6 °F (37 °C) 87 24 Abnormal   111/60 97 % None (Room air) -- Lying   Resp: RN Notified at 06/26/23 0316 06/25/23 2300 97 8 °F (36 6 °C) 95 18 136/83 -- None (Room air) WDL Sitting   06/25/23 1900 97 5 °F (36 4 °C) 84 18 135/75 -- -- -- Sitting   06/25/23 1645 97 9 °F (36 6 °C) 92 -- 127/66 -- -- -- --     06/24/23 1052 -- 65 -- 138/84 -- -- -- --   06/24/23 1051 -- 65 -- 138/84 -- -- -- -- "  06/24/23 1008 -- 77 -- 142/83 -- -- -- --   06/24/23 0936 -- 82 -- 133/80 -- -- -- --   06/24/23 0906 -- 73 -- 143/90 -- -- -- --   06/24/23 0735 98 8 °F (37 1 °C) -- 16 -- -- -- -- --   06/24/23 0639 -- 71 -- 137/72 -- -- -- --   06/24/23 0622 -- 65 -- 123/65 -- -- -- --   06/24/23 0607 -- 83 -- 139/90 -- -- -- --   06/24/23 0551 -- 66 -- 147/80  -- -- -- --   BP: Dr Meghana Hartmann aware at 06/24/23 0551   06/24/23 0536 -- 73 -- 135/96 -- -- -- --   06/24/23 0506 -- 68 -- 137/75 -- -- -- --   06/24/23 0452 -- 68 -- 145/83 -- -- -- --   06/24/23 0114 -- 78 -- 121/69 -- -- -- --     Weights (last 14 days)    Date/Time Weight Height   06/23/23 2031 86 1 kg (189 lb 12 8 oz) 5' 2\" (1 575 m)     Pertinent Labs/Diagnostic Test Results:   No orders to display         Results from last 7 days   Lab Units 06/23/23 2047   WBC Thousand/uL 9 69   HEMOGLOBIN g/dL 11 6   HEMATOCRIT % 35 6   PLATELETS Thousands/uL 146*         Results from last 7 days   Lab Units 06/23/23 2047   SODIUM mmol/L 138   POTASSIUM mmol/L 3 4*   CHLORIDE mmol/L 110*   CO2 mmol/L 20*   ANION GAP mmol/L 8   BUN mg/dL 6   CREATININE mg/dL 0 57*   EGFR ml/min/1 73sq m 134   CALCIUM mg/dL 8 1*     Results from last 7 days   Lab Units 06/23/23 2047   AST U/L 12*   ALT U/L 8   ALK PHOS U/L 116*   TOTAL PROTEIN g/dL 5 9*   ALBUMIN g/dL 3 3*   TOTAL BILIRUBIN mg/dL 0 39         Results from last 7 days   Lab Units 06/23/23  2047   GLUCOSE RANDOM mg/dL 98               Results from last 7 days   Lab Units 06/24/23  0613   CREATININE UR mg/dL 72 4   PROTEIN UR mg/dL 13   PROT/CREAT RATIO UR  0 18*            ED Treatment:   Medication Administration - No Administrations Displayed (No Start Event Found)     None        Past Medical History:   Diagnosis Date   • Asthma      Present on Admission:  • Obesity (BMI 30 0-34  9)  • Positive GBS test  • Anemia affecting pregnancy in third trimester      Admitting Diagnosis: Encounter for induction of labor [Z34 90]  Age/Sex: 17 " y o  female  Admission Orders:  Continuous external uterine contraction & fetal HR monitoring    Scheduled Medications:  docusate sodium, 100 mg, Oral, BID  etonogestrel, 68 mg, Subdermal, Once   And  lidocaine (PF), 2 mL, Infiltration, Once  ferrous sulfate, 325 mg, Oral, Daily With Breakfast  ibuprofen, 600 mg, Oral, Q6H  oxytocin, 62 5 mert-units/min, Intravenous, Once    Continuous IV Infusions:  lactated ringers, 125 mL/hr, Intravenous, Continuous  oxytocin (PITOCIN) 30 Units in lactated ringers 500 mL infusion 6/24 0449 & DC 6/25 1522  Rate: 1-30 mL/hr Dose: 1-30 mert-units/min  Freq: Titrated Route: IV    PRN Meds:  acetaminophen, 650 mg, Oral, Q4H PRN  benzocaine-menthol-lanolin-aloe, 1 Application, Topical, K8M PRN  calcium carbonate, 1,000 mg, Oral, Daily PRN  diphenhydrAMINE, 25 mg, Oral, Q6H PRN  hydrocortisone, 1 Application, Topical, Daily PRN  ondansetron, 4 mg, Intravenous, Q8H PRN  simethicone, 80 mg, Oral, 4x Daily PRN  witch hazel-glycerin, 1 pad , Topical, Q4H PRN      Network Utilization Review Department  ATTENTION: Please call with any questions or concerns to [phone] and carefully listen to the prompts so that you are directed to the right person  All voicemails are confidential   Malissa Query all requests for admission clinical reviews, approved or denied determinations and any other requests to dedicated fax number below belonging to the campus where the patient is receiving treatment   List of dedicated fax numbers for the Facilities:  [address] DENIALS (Administrative/Medical Necessity) [phone]   [address] [place] [address] (Maternity/NICU/Pediatrics) [phone]   [address] [place]  [phone]   [address] [address] [facility] [phone]   [address] [facility] 134 815 [facility] [phone]

## 2023-06-27 VITALS
DIASTOLIC BLOOD PRESSURE: 70 MMHG | HEART RATE: 78 BPM | SYSTOLIC BLOOD PRESSURE: 136 MMHG | WEIGHT: 189.8 LBS | RESPIRATION RATE: 18 BRPM | BODY MASS INDEX: 34.93 KG/M2 | HEIGHT: 62 IN | OXYGEN SATURATION: 97 % | TEMPERATURE: 98.4 F

## 2023-06-27 PROCEDURE — 99024 POSTOP FOLLOW-UP VISIT: CPT | Performed by: OBSTETRICS & GYNECOLOGY

## 2023-06-27 PROCEDURE — NC001 PR NO CHARGE: Performed by: OBSTETRICS & GYNECOLOGY

## 2023-06-27 PROCEDURE — 11981 INSERTION DRUG DLVR IMPLANT: CPT | Performed by: OBSTETRICS & GYNECOLOGY

## 2023-06-27 RX ORDER — ACETAMINOPHEN 325 MG/1
650 TABLET ORAL EVERY 4 HOURS PRN
Refills: 0
Start: 2023-06-27

## 2023-06-27 RX ORDER — IBUPROFEN 600 MG/1
600 TABLET ORAL EVERY 6 HOURS
Qty: 30 TABLET | Refills: 0
Start: 2023-06-27

## 2023-06-27 RX ADMIN — LIDOCAINE HYDROCHLORIDE 2 ML: 10 INJECTION, SOLUTION EPIDURAL; INFILTRATION; INTRACAUDAL; PERINEURAL at 14:20

## 2023-06-27 RX ADMIN — IBUPROFEN 600 MG: 600 TABLET, FILM COATED ORAL at 06:24

## 2023-06-27 RX ADMIN — ETONOGESTREL 68 MG: 68 IMPLANT SUBCUTANEOUS at 14:23

## 2023-06-27 RX ADMIN — IBUPROFEN 600 MG: 600 TABLET, FILM COATED ORAL at 13:25

## 2023-06-27 RX ADMIN — FERROUS SULFATE TAB 325 MG (65 MG ELEMENTAL FE) 325 MG: 325 (65 FE) TAB at 09:25

## 2023-06-27 RX ADMIN — DOCUSATE SODIUM 100 MG: 100 CAPSULE, LIQUID FILLED ORAL at 09:25

## 2023-06-27 NOTE — NURSING NOTE
"Discharge education completed with patient  Handouts reviewed  \"Save Your Life\" magnet explained and given to patient  Questions encouraged and addressed  Patient verbalizes understanding     "

## 2023-06-27 NOTE — PLAN OF CARE
Problem: POSTPARTUM  Goal: Experiences normal postpartum course  Description: INTERVENTIONS:  - Monitor maternal vital signs  - Assess uterine involution and lochia  Outcome: Progressing  Goal: Appropriate maternal -  bonding  Description: INTERVENTIONS:  - Identify family support  - Assess for appropriate maternal/infant bonding   -Encourage maternal/infant bonding opportunities  - Referral to  or  as needed  Outcome: Progressing  Goal: Establishment of infant feeding pattern  Description: INTERVENTIONS:  - Assess breast/bottle feeding  - Refer to lactation as needed  Outcome: Progressing  Goal: Incision(s), wounds(s) or drain site(s) healing without S/S of infection  Description: INTERVENTIONS  - Assess and document dressing, incision, wound bed, drain sites and surrounding tissue  - Provide patient and family education  - Perform skin care/dressing changes everyday  Outcome: Progressing

## 2023-06-27 NOTE — PROCEDURES
"Universal Protocol:  Consent: Verbal consent obtained  Written consent obtained  Risks and benefits: risks, benefits and alternatives were discussed  Consent given by: patient  Time out: Immediately prior to procedure a \"time out\" was called to verify the correct patient, procedure, equipment, support staff and site/side marked as required  Timeout called at: 6/27/2023 2:15 PM   Patient understanding: patient states understanding of the procedure being performed  Patient consent: the patient's understanding of the procedure matches consent given  Procedure consent: procedure consent matches procedure scheduled  Relevant documents: relevant documents present and verified  Test results: test results available and properly labeled  Required items: required blood products, implants, devices, and special equipment available  Patient identity confirmed: verbally with patient    Remove and insert drug implant    Date/Time: 6/27/2023 1:47 PM    Performed by: Shania Morris MD  Authorized by: Rogelio Land MD    Indication:     Indication: Insertion of non-biodegradable drug delivery implant    Pre-procedure:     Pre-procedure timeout performed: yes      Prepped with: alcohol 70%      Local anesthetic:  Lidocaine 1%    The site was cleaned and prepped in a sterile fashion: yes    Procedure:     Procedure: Insertion    Small stab incision was made in arm: yes      Left/right:  Right    Preloaded contraceptive capsule trocar was placed subdermally: yes      Visualization of implant was obtained: yes      Contraceptive capsule was inserted and trocar removed: yes      Visualization of notch in stylet and palpation of device: yes      Palpation confirms placement by provider and patient: yes    Comments:      Procedure: Nexplanon insertion    The patient is left handed  The right arm was chosen  Consent was explained and signed  The patient was not pregnant; she just delivered a baby   The patient was positioned supine " with her arm externally rotated and flexed at the elbow with her hand behind her head  The insertion site was chosen 8-10 cm medial to the medial epicondyle, and 3-5 cm posterior the sulcus between the bicep and tricep in order to avoid the vascular bundle and ulnar nerve  The skin was cleansed with alcohol  1% lidocaine was infiltrated at the insertion site  The device was removed from its package  The implant was visible within the insertion device  The insertion was performed according to standard procedure: The tip of the  was passed through the skin, the skin was tented up, and the device was advanced until its full length was beneath the skin  The trigger was activated at and the sheath withdrawn  The implant was successfully deployed  There was a small amount of bleeding from the skin edge which was controlled with pressure  The implant was palpable by me, the patient  A Band-Aid was placed over the insertion site  The patient tolerated the procedure well      Lot number: [id number]  Expiration: 5/19/2025

## 2023-06-27 NOTE — PROGRESS NOTES
"Progress Note - OB/GYN  Vi aMrino 21 y o  female MRN: [id number]  Unit/Bed#: L&D 305-01 Encounter: [id number]    Assessment and Plan     Vi Marino is a patient of: [address] [facility]  She is PPD# 2 s/p  spontaneous vaginal delivery  Recovering well and is stable       Gestational hypertension, third trimester  Assessment & Plan  Systolic (83ZAL), JNK:440 , Min:115 , SGD:974     Diastolic (27VUV), JJC:49, Min:61, Max:88    Asymptomatic  Continue to trend pressures per protocol    Positive GBS test  Assessment & Plan  S/p PCN gtt for intrapartum management    Anemia affecting pregnancy in third trimester  Assessment & Plan  Prior to admission hemoglobin 10 5  Hgb 11 6 on admission    Obesity (BMI 30 0-34  9)  Assessment & Plan  BMI 34    *  (spontaneous vaginal delivery)  Assessment & Plan  Pain well-controlled  Voiding spontaneously, passing flatus  Encouraged breastfeeding  Encouraged ambulation  Contraception: Nexplanon to be placed      Disposition    - Anticipate discharge home on PPD# 2      Subjective/Objective     Chief Complaint: Postpartum State     Subjective:    Vi Marino is PPD/POD#2 s/p  spontaneous vaginal delivery  She has no current complaints  Pain is well controlled  Patient is currently voiding  She is ambulating  Patient is currently passing flatus and has had no bowel movement  She is tolerating PO, and denies nausea or vomitting  Patient denies fever, chills, chest pain, shortness of breath, or calf tenderness  Lochia is normal  She is  Breastfeeding and Bottle feeding  She is recovering well and is stable         Vitals:   /88 (BP Location: Right arm)   Pulse 92   Temp 98 °F (36 7 °C) (Temporal)   Resp 16   Ht 5' 2\" (1 575 m)   Wt 86 1 kg (189 lb 12 8 oz)   LMP 09/10/2022 (Exact Date)   SpO2 99%   Breastfeeding Yes   BMI 34 71 kg/m²     No intake or output data in the 24 hours ending 23 0614    Invasive Devices     " None                 Physical Exam:   GEN: Sharmin Early appears well, alert and oriented x 3, pleasant and cooperative   CARDIO: RRR, no murmurs or rubs  RESP:  CTAB, no wheezes or rales  ABDOMEN: soft, no tenderness, no distention, fundus @ 2cm below umbilicus  EXTREMITIES: SCDs on, non tender, no erythema, b/l Sesar's sign negative      Labs:     Hemoglobin   Date Value Ref Range Status   06/23/2023 11 6 11 5 - 15 4 g/dL Final   03/23/2023 10 5 (L) 11 5 - 15 4 g/dL Final     WBC   Date Value Ref Range Status   06/23/2023 9 69 4 31 - 10 16 Thousand/uL Final   03/23/2023 9 97 4 31 - 10 16 Thousand/uL Final     Platelets   Date Value Ref Range Status   06/23/2023 146 (L) 149 - 390 Thousands/uL Final   03/23/2023 174 149 - 390 Thousands/uL Final     Creatinine   Date Value Ref Range Status   06/23/2023 0 57 (L) 0 60 - 1 30 mg/dL Final     Comment:     Standardized to IDMS reference method   10/24/2022 0 56 (L) 0 60 - 1 20 mg/dL Final     Comment:     Standardized to IDMS reference method     AST   Date Value Ref Range Status   06/23/2023 12 (L) 13 - 39 U/L Final   10/24/2022 17 14 - 36 U/L Final     Comment:     Specimen collection should occur prior to Sulfasalazine administration due to the potential for falsely depressed results  ALT   Date Value Ref Range Status   06/23/2023 8 7 - 52 U/L Final     Comment:     Specimen collection should occur prior to Sulfasalazine administration due to the potential for falsely depressed results  10/24/2022 16 <35 U/L Final     Comment:     Specimen collection should occur prior to Sulfasalazine administration due to the potential for falsely depressed results             Christal Hinojosa MD  6/27/2023  6:14 AM

## 2023-06-28 NOTE — UTILIZATION REVIEW
"NOTIFICATION OF INPATIENT ADMISSION   MATERNITY/DELIVERY AUTHORIZATION REQUEST   SERVICING FACILITY:   [address] [place] [address] - L&D, , NICU  [address] [place] [address] Chivo Kurtz [facility], [address] [facility]  Tax ID: [id number]  NPI: [id number] ATTENDING PROVIDER:  Attending Name and NPI#: Jimy Quesada Md [[id number]]  Address: [address] [place], [place] [address]fortunato Kurtz [facility], [address] [facility]  Phone: [phone]     ADMISSION INFORMATION:  Place of Service: Inpatient [address] [facility]  [address]  Place of Service Code: 21  Inpatient Admission Date/Time: 23  8:38 PM  Discharge Date/Time: 2023  4:11 PM  Admitting Diagnosis Code/Description:  Encounter for induction of labor [Z34 90]     Mother: Berta Dukes 2005 Estimated Date of Delivery: 23  Delivering clinician: Jimy Quesada    OB History        1    Para   1    Term   1       0    AB   0    Living   1       SAB   0    IAB   0    Ectopic   0    Multiple   0    Live Births   1               [place] Name & MRN:   Information for the patient's :  Alberto [place] Girl Amna Mon) [[id number]]      Delivery Information:  Sex: female  Delivered 2023 2:48 PM by Vaginal, Spontaneous; Gestational Age: 44w2d     Measurements:  Weight: 6 lb 11 9 oz (3060 g); Height: 19 5\"    APGAR 1 minute 5 minutes 10 minutes   Totals: 8 9       Birth Information: 21 y o  female MRN: [id number] Unit/Bed#: L&D 305-01   Birthweight: No birth weight on file  Gestational Age: <None> Delivery Type:    APGARS Totals:        UTILIZATION REVIEW CONTACT:  Luciano Jones Utilization   Network Utilization Review Department  Phone: [phone]  Fax [phone]  Email: [email or web address]  Contact for approvals/pending authorizations, clinical reviews, and discharge       PHYSICIAN ADVISORY SERVICES:  Medical Necessity Denial & Gczn-tj-Pipi Review  Phone: [phone]  Fax: [phone]  Email: " [email or web address]           Initial Clinical Review    Admission: Date/Time/Statement:   Admission Orders (From admission, onward)     Ordered        23  Inpatient Admission  Once                      Orders Placed This Encounter   Procedures   • Inpatient Admission     Standing Status:   Standing     Number of Occurrences:   1     Order Specific Question:   Level of Care     Answer:   Med Surg [16]     Order Specific Question:   Estimated length of stay     Answer:   More than 2 Midnights     Order Specific Question:   Certification     Answer:   I certify that inpatient services are medically necessary for this patient for a duration of greater than two midnights  See H&P and MD Progress Notes for additional information about the patient's course of treatment  ED Arrival Information     Patient not seen in ED                     Chief Complaint   Patient presents with   • Scheduled Induction       Initial Presentation: 21 y o  female  at 39w0d Inpatient admission to triage due to  [place] 57    SVE: 2/-9 on , Cephalic confirmed by TAUS, GBS status: positive     IOL plan continuous fetal monitoring, cl liq diet, IVF, IV antibx; begin w PO Cytotec, (2) fam balloon,  IV Pit titration, SROM & AROM of forebag, epidural,     2023 Viable female  at 1, with APGARS of 8 and 9, birthwt= 3060 g (6 lb 11 9 oz)   Date: 2023   Day 2:   @ 4:24 AM  PROCEDURE:  FAM BALLOON PLACEMENT  @ 8:30 AM  second Fam balloon   Oxytocin: 6 mert-units/min (decreased per Dr Franklyn Napier prior to new fam balloon placement)  Contraction Frequency (minutes): 2-3  Contraction Quality: Mild  Cervical Dilation: 1-2  Cervical Effacement: 60  Fetal Station: -3  @ 1:45 PM  Oxytocin: 10 mert-units/min  Contraction Frequency (minutes): 2-3 5  Contraction Quality: Mild  Cervical Dilation: 5-6  Cervical Effacement: 80  Fetal Station: -3   2023 9:08 PM epidural   @ 3:39 PM  SROM 1 HR ago now w prominent forebag w AROM performed w more cl fluid  DATE  6/25/2023  DAY 3  @ 3:57 AM  IUPC and FSE placed for monitoring, patient has been on Pitocin for 24 hours so will likely give Pit break and small snack   Oxytocin: 16 mert-units/min  Contraction Frequency (minutes): 2  Contraction Quality: Strong  Cervical Dilation: 6  Cervical Effacement: 90  Fetal Station: 0  @ 1:00PM  Oxytocin: 20 mert-units/min  Contraction Frequency (minutes): 2-4  Contraction Quality: Strong  Tachysystole: No   Cervical Dilation: 5  Cervical Effacement: 90  Fetal Station: 1  @ 2:13 PM Oxytocin: 22 mert-units/min  Contraction Frequency (minutes): 2-3  Contraction Quality: Strong  Cervical Dilation: 10  Dilation Complete Date: 06/25/23  Dilation Complete Time: 1412  Cervical Effacement: 100  Fetal Station: 2    6/25/2023 Viable female at 1, with APGARS of 8 and 9, birthwt= 3060 g (6 lb 11 9 oz)    Triage Vitals   Temperature Pulse Respirations Blood Pressure SpO2   06/23/23 2031 06/23/23 2115 06/23/23 2031 06/23/23 2115 06/26/23 0316   98 6 °F (37 °C) 83 20 131/83 97 %      Temp Source Heart Rate Source Patient Position - Orthostatic VS BP Location FiO2 (%)   06/23/23 2031 06/23/23 2031 06/23/23 2031 06/23/23 2031 --   Oral Monitor Lying Right arm       Pain Score       06/23/23 2031       No Pain          Wt Readings from Last 1 Encounters:   06/23/23 86 1 kg (189 lb 12 8 oz) (96 %, Z= 1 78)*     * Growth percentiles are based on CDC (Girls, 2-20 Years) data       Additional Vital Signs:   06/26/23 0700 97 8 °F (36 6 °C) 80 20 129/79 98 % None (Room air) -- Lying   06/26/23 0316 98 6 °F (37 °C) 87 24 Abnormal   111/60 97 % None (Room air) -- Lying   Resp: RN Notified at 06/26/23 0316 06/25/23 2300 97 8 °F (36 6 °C) 95 18 136/83 -- None (Room air) WDL Sitting   06/25/23 1900 97 5 °F (36 4 °C) 84 18 135/75 -- -- -- Sitting   06/25/23 1645 97 9 °F (36 6 °C) 92 -- 127/66 -- -- -- --     06/24/23 1052 -- 65 -- 138/84 -- -- -- -- "  06/24/23 1051 -- 65 -- 138/84 -- -- -- --   06/24/23 1008 -- 77 -- 142/83 -- -- -- --   06/24/23 0936 -- 82 -- 133/80 -- -- -- --   06/24/23 0906 -- 73 -- 143/90 -- -- -- --   06/24/23 0735 98 8 °F (37 1 °C) -- 16 -- -- -- -- --   06/24/23 0639 -- 71 -- 137/72 -- -- -- --   06/24/23 0622 -- 65 -- 123/65 -- -- -- --   06/24/23 0607 -- 83 -- 139/90 -- -- -- --   06/24/23 0551 -- 66 -- 147/80  -- -- -- --   BP: Dr Salgado Smoker aware at 06/24/23 0551   06/24/23 0536 -- 73 -- 135/96 -- -- -- --   06/24/23 0506 -- 68 -- 137/75 -- -- -- --   06/24/23 0452 -- 68 -- 145/83 -- -- -- --   06/24/23 0114 -- 78 -- 121/69 -- -- -- --     Weights (last 14 days)    Date/Time Weight Height   06/23/23 2031 86 1 kg (189 lb 12 8 oz) 5' 2\" (1 575 m)     Pertinent Labs/Diagnostic Test Results:   No orders to display         Results from last 7 days   Lab Units 06/23/23 2047   WBC Thousand/uL 9 69   HEMOGLOBIN g/dL 11 6   HEMATOCRIT % 35 6   PLATELETS Thousands/uL 146*         Results from last 7 days   Lab Units 06/23/23 2047   SODIUM mmol/L 138   POTASSIUM mmol/L 3 4*   CHLORIDE mmol/L 110*   CO2 mmol/L 20*   ANION GAP mmol/L 8   BUN mg/dL 6   CREATININE mg/dL 0 57*   EGFR ml/min/1 73sq m 134   CALCIUM mg/dL 8 1*     Results from last 7 days   Lab Units 06/23/23 2047   AST U/L 12*   ALT U/L 8   ALK PHOS U/L 116*   TOTAL PROTEIN g/dL 5 9*   ALBUMIN g/dL 3 3*   TOTAL BILIRUBIN mg/dL 0 39         Results from last 7 days   Lab Units 06/23/23  2047   GLUCOSE RANDOM mg/dL 98               Results from last 7 days   Lab Units 06/24/23  0613   CREATININE UR mg/dL 72 4   PROTEIN UR mg/dL 13   PROT/CREAT RATIO UR  0 18*            ED Treatment:   Medication Administration - No Administrations Displayed (No Start Event Found)     None        Past Medical History:   Diagnosis Date   • Asthma      Present on Admission:  • Obesity (BMI 30 0-34  9)  • Positive GBS test  • Anemia affecting pregnancy in third trimester      Admitting Diagnosis: Encounter " for induction of labor [Z34 90]  Age/Sex: 21 y o  female  Admission Orders:  Continuous external uterine contraction & fetal HR monitoring    Scheduled Medications:  docusate sodium, 100 mg, Oral, BID  etonogestrel, 68 mg, Subdermal, Once   And  lidocaine (PF), 2 mL, Infiltration, Once  ferrous sulfate, 325 mg, Oral, Daily With Breakfast  ibuprofen, 600 mg, Oral, Q6H  oxytocin, 62 5 mert-units/min, Intravenous, Once    Continuous IV Infusions:  lactated ringers, 125 mL/hr, Intravenous, Continuous  oxytocin (PITOCIN) 30 Units in lactated ringers 500 mL infusion 6/24 0449 & DC 6/25 1522  Rate: 1-30 mL/hr Dose: 1-30 mert-units/min  Freq: Titrated Route: IV    PRN Meds:  acetaminophen, 650 mg, Oral, Q4H PRN  benzocaine-menthol-lanolin-aloe, 1 Application, Topical, G3C PRN  calcium carbonate, 1,000 mg, Oral, Daily PRN  diphenhydrAMINE, 25 mg, Oral, Q6H PRN  hydrocortisone, 1 Application, Topical, Daily PRN  ondansetron, 4 mg, Intravenous, Q8H PRN  simethicone, 80 mg, Oral, 4x Daily PRN  witch hazel-glycerin, 1 pad , Topical, Q4H PRN      Network Utilization Review Department  ATTENTION: Please call with any questions or concerns to [phone] and carefully listen to the prompts so that you are directed to the right person  All voicemails are confidential   Tracee Guzman all requests for admission clinical reviews, approved or denied determinations and any other requests to dedicated fax number below belonging to the campus where the patient is receiving treatment   List of dedicated fax numbers for the Facilities:  [address] [place] [address] DENIALS (Administrative/Medical Necessity) [phone]   [address] (Maternity/NICU/Pediatrics) [phone]   [facility] Merry Veliz [address] [place] [address] [address] [place] [address] Alejandro Otero [facility] [address] [facility] [address] [phone]

## 2023-07-03 LAB — PLACENTA IN STORAGE: NORMAL

## 2023-07-05 ENCOUNTER — POSTPARTUM VISIT (OUTPATIENT)
Dept: OBGYN CLINIC | Facility: CLINIC | Age: 18
End: 2023-07-05

## 2023-07-05 VITALS
WEIGHT: 164.2 LBS | BODY MASS INDEX: 30.03 KG/M2 | HEART RATE: 91 BPM | SYSTOLIC BLOOD PRESSURE: 133 MMHG | DIASTOLIC BLOOD PRESSURE: 75 MMHG

## 2023-07-05 NOTE — PROGRESS NOTES
OB/GYN Postpartum Appointment    ASSESSMENT / PLAN:    Jaenth Rutherford is a 21 y.o. Luba Smiley presenting for postpartum appointment. Contraception: Nexplanon  Bottle feeding  Information for [facility] reviewed  Increase activity as tolerated, may resume all normal activity including sexual activity at 6 weeks postpartum  Routine cervical cancer screening when patient     SUBJECTIVE:    Janeth Rutherford is a 21 y.o. Luba Fine who presents for postpartum appointment. Her pregnancy was complicated by gestational hypertension in the third trimester. She delivered via  on 23.  6 lb 12oz female. Delivery course uncomplicated. Bleeding thin lochia. Bowel function is normal.  Bladder function is normal.  Patient has not been sexually active. Counseled that normal sexual activity may be resumed starting at 6 weeks postpartum. Contraceptive plan: Nexplanon  Postpartum depression screening: negative, EPDS: 0.  Baby's course has been uncomplicated.   Baby is feeding by bottle       Current Outpatient Medications:   •  acetaminophen (TYLENOL) 325 mg tablet, Take 2 tablets (650 mg total) by mouth every 4 (four) hours as needed for mild pain, Disp: , Rfl: 0  •  etonogestrel (NEXPLANON) subdermal implant, 68 mg by Subdermal route once, Disp: , Rfl:   •  ferrous sulfate 324 (65 Fe) mg, Take 1 tablet (324 mg total) by mouth 2 (two) times a day before meals, Disp: 60 tablet, Rfl: 3  •  benzocaine-menthol-lanolin-aloe (DERMOPLAST) 20-0.5 % topical spray, Apply 1 Application topically every 6 (six) hours as needed for mild pain or irritation (Patient not taking: Reported on 2023), Disp: , Rfl: 0  •  ibuprofen (MOTRIN) 600 mg tablet, Take 1 tablet (600 mg total) by mouth every 6 (six) hours (Patient not taking: Reported on 2023), Disp: 30 tablet, Rfl: 0  •  Prenatal Vit-Fe Fumarate-FA (Prenatal Complete) 14-0.4 MG TABS, Take 1 tablet by mouth in the morning (Patient not taking: Reported on 7/5/2023), Disp: 30 tablet, Rfl: 0  •  witch hazel-glycerin (TUCKS) topical pad, Apply 1 Pad topically every 4 (four) hours as needed for irritation (Patient not taking: Reported on 7/5/2023), Disp: , Rfl: 0    No Known Allergies    OBJECTIVE:  /75   Pulse 91   Wt 74.5 kg (164 lb 3.2 oz)   LMP 09/10/2022 (Exact Date)   Breastfeeding Yes   BMI 30.03 kg/m²     General: Well appearing, no distress. Mood and affect: Appropriate. Respiratory: Unlabored breathing  Cardiovascular: Regular rate and rhythm. Abdomen: Soft, nontender  Extremities: Warm and well perfused. Non tender. Pelvic exam deferred.       Jessica Parmar MD  07/05/23  6:59 PM

## 2023-07-18 ENCOUNTER — POSTPARTUM VISIT (OUTPATIENT)
Dept: OBGYN CLINIC | Facility: CLINIC | Age: 18
End: 2023-07-18

## 2023-07-18 VITALS
WEIGHT: 163 LBS | SYSTOLIC BLOOD PRESSURE: 117 MMHG | DIASTOLIC BLOOD PRESSURE: 74 MMHG | HEART RATE: 64 BPM | BODY MASS INDEX: 30 KG/M2 | HEIGHT: 62 IN

## 2023-07-18 PROBLEM — Z3A.39 39 WEEKS GESTATION OF PREGNANCY: Status: RESOLVED | Noted: 2022-12-28 | Resolved: 2023-07-18

## 2023-07-18 PROBLEM — O99.013 ANEMIA AFFECTING PREGNANCY IN THIRD TRIMESTER: Status: RESOLVED | Noted: 2023-04-16 | Resolved: 2023-07-18

## 2023-07-18 PROCEDURE — 99024 POSTOP FOLLOW-UP VISIT: CPT | Performed by: OBSTETRICS & GYNECOLOGY

## 2023-07-18 NOTE — PROGRESS NOTES
POSTPARTUM VISIT    Claudio Lundberg presents today for postpartum visit. She had a vaginal  delivery on 06/25/23. Complications included none. She is breast and bottle feeding her infant and reports no issues with such. She has a Nexplanon for contraception. She was provided with [place] [employer] Depression Screening tool and her score was 0. Baby "Praveena Dueñas"    Review of Systems:   -Constitutional: denies issues, denies pain   -Breasts: denies tenderness   -Gynecologic: lochia is on and off   -Urinary: denies issues urinating   -GI: stools WNL, denies issues    Physical Exam:   -Vitals:   Vitals:    07/18/23 1519   BP: 117/74   Pulse: 64   Weight: 73.9 kg (163 lb)   Height: 5' 2" (1.575 m)      -General: A&Ox3, no acute distress noted   -Abdomen: soft, non-tender   -Extremities: nontender, no edema noted    Assessment/Plan:  1. Normal postpartum exam.  2. Depression screening negative. 3.  Advise return for next annual GYN exam in one year.   4. Contraception: Nexplanon in place

## 2023-12-12 ENCOUNTER — APPOINTMENT (EMERGENCY)
Dept: CT IMAGING | Facility: HOSPITAL | Age: 18
End: 2023-12-12
Payer: COMMERCIAL

## 2023-12-12 ENCOUNTER — HOSPITAL ENCOUNTER (EMERGENCY)
Facility: HOSPITAL | Age: 18
Discharge: HOME | End: 2023-12-12
Attending: EMERGENCY MEDICINE
Payer: COMMERCIAL

## 2023-12-12 VITALS
RESPIRATION RATE: 20 BRPM | BODY MASS INDEX: 33.58 KG/M2 | DIASTOLIC BLOOD PRESSURE: 89 MMHG | OXYGEN SATURATION: 100 % | TEMPERATURE: 98.2 F | SYSTOLIC BLOOD PRESSURE: 133 MMHG | HEART RATE: 94 BPM | WEIGHT: 183.6 LBS

## 2023-12-12 DIAGNOSIS — N13.30 HYDRONEPHROSIS: ICD-10-CM

## 2023-12-12 DIAGNOSIS — N20.1 URETEROLITHIASIS: Primary | ICD-10-CM

## 2023-12-12 LAB
AMORPH URATE CRY URNS QL MICRO: ABNORMAL /HPF
ANION GAP SERPL CALCULATED.3IONS-SCNC: 10 MMOL/L
BACTERIA UR QL AUTO: ABNORMAL /HPF
BASOPHILS # BLD AUTO: 0.03 THOUSANDS/ÂΜL (ref 0–0.1)
BASOPHILS NFR BLD AUTO: 0 % (ref 0–1)
BILIRUB UR QL STRIP: NEGATIVE
BUN SERPL-MCNC: 16 MG/DL (ref 5–25)
CALCIUM SERPL-MCNC: 9.4 MG/DL (ref 8.4–10.2)
CHLORIDE SERPL-SCNC: 106 MMOL/L (ref 96–108)
CLARITY UR: ABNORMAL
CO2 SERPL-SCNC: 21 MMOL/L (ref 21–32)
COLOR UR: ABNORMAL
CREAT SERPL-MCNC: 0.67 MG/DL (ref 0.6–1.3)
EOSINOPHIL # BLD AUTO: 0.08 THOUSAND/ÂΜL (ref 0–0.61)
EOSINOPHIL NFR BLD AUTO: 1 % (ref 0–6)
ERYTHROCYTE [DISTWIDTH] IN BLOOD BY AUTOMATED COUNT: 12.1 % (ref 11.6–15.1)
EXT PREGNANCY TEST URINE: NEGATIVE
EXT. CONTROL: NORMAL
GFR SERPL CREATININE-BSD FRML MDRD: 126 ML/MIN/1.73SQ M
GLUCOSE SERPL-MCNC: 97 MG/DL (ref 65–140)
GLUCOSE UR STRIP-MCNC: NEGATIVE MG/DL
HCT VFR BLD AUTO: 40.3 % (ref 34.8–46.1)
HGB BLD-MCNC: 13.8 G/DL (ref 11.5–15.4)
HGB UR QL STRIP.AUTO: 250
IMM GRANULOCYTES # BLD AUTO: 0.04 THOUSAND/UL (ref 0–0.2)
IMM GRANULOCYTES NFR BLD AUTO: 0 % (ref 0–2)
KETONES UR STRIP-MCNC: ABNORMAL MG/DL
LEUKOCYTE ESTERASE UR QL STRIP: 25
LYMPHOCYTES # BLD AUTO: 1.35 THOUSANDS/ÂΜL (ref 0.6–4.47)
LYMPHOCYTES NFR BLD AUTO: 11 % (ref 14–44)
MCH RBC QN AUTO: 28.2 PG (ref 26.8–34.3)
MCHC RBC AUTO-ENTMCNC: 34.2 G/DL (ref 31.4–37.4)
MCV RBC AUTO: 82 FL (ref 82–98)
MONOCYTES # BLD AUTO: 0.52 THOUSAND/ÂΜL (ref 0.17–1.22)
MONOCYTES NFR BLD AUTO: 4 % (ref 4–12)
MUCOUS THREADS UR QL AUTO: ABNORMAL
NEUTROPHILS # BLD AUTO: 10.49 THOUSANDS/ÂΜL (ref 1.85–7.62)
NEUTS SEG NFR BLD AUTO: 84 % (ref 43–75)
NITRITE UR QL STRIP: NEGATIVE
NON-SQ EPI CELLS URNS QL MICRO: ABNORMAL /HPF
NRBC BLD AUTO-RTO: 0 /100 WBCS
PH UR STRIP.AUTO: 5 [PH]
PLATELET # BLD AUTO: 216 THOUSANDS/UL (ref 149–390)
PMV BLD AUTO: 10.1 FL (ref 8.9–12.7)
POTASSIUM SERPL-SCNC: 4 MMOL/L (ref 3.5–5.3)
PROT UR STRIP-MCNC: ABNORMAL MG/DL
RBC # BLD AUTO: 4.9 MILLION/UL (ref 3.81–5.12)
RBC #/AREA URNS AUTO: ABNORMAL /HPF
SODIUM SERPL-SCNC: 137 MMOL/L (ref 135–147)
SP GR UR STRIP.AUTO: 1.03 (ref 1–1.04)
UROBILINOGEN UA: NEGATIVE MG/DL
WBC # BLD AUTO: 12.51 THOUSAND/UL (ref 4.31–10.16)
WBC #/AREA URNS AUTO: ABNORMAL /HPF

## 2023-12-12 PROCEDURE — 87591 N.GONORRHOEAE DNA AMP PROB: CPT

## 2023-12-12 PROCEDURE — 81003 URINALYSIS AUTO W/O SCOPE: CPT

## 2023-12-12 PROCEDURE — 87491 CHLMYD TRACH DNA AMP PROBE: CPT

## 2023-12-12 PROCEDURE — 99284 EMERGENCY DEPT VISIT MOD MDM: CPT

## 2023-12-12 PROCEDURE — 81001 URINALYSIS AUTO W/SCOPE: CPT

## 2023-12-12 PROCEDURE — 36415 COLL VENOUS BLD VENIPUNCTURE: CPT

## 2023-12-12 PROCEDURE — 85025 COMPLETE CBC W/AUTO DIFF WBC: CPT

## 2023-12-12 PROCEDURE — 81025 URINE PREGNANCY TEST: CPT

## 2023-12-12 PROCEDURE — 74176 CT ABD & PELVIS W/O CONTRAST: CPT

## 2023-12-12 PROCEDURE — G1004 CDSM NDSC: HCPCS

## 2023-12-12 PROCEDURE — 80048 BASIC METABOLIC PNL TOTAL CA: CPT

## 2023-12-12 PROCEDURE — 96372 THER/PROPH/DIAG INJ SC/IM: CPT

## 2023-12-12 RX ORDER — KETOROLAC TROMETHAMINE 30 MG/ML
15 INJECTION, SOLUTION INTRAMUSCULAR; INTRAVENOUS ONCE
Status: COMPLETED | OUTPATIENT
Start: 2023-12-12 | End: 2023-12-12

## 2023-12-12 RX ORDER — TAMSULOSIN HYDROCHLORIDE 0.4 MG/1
0.4 CAPSULE ORAL
Qty: 14 CAPSULE | Refills: 0 | Status: SHIPPED | OUTPATIENT
Start: 2023-12-12 | End: 2023-12-28

## 2023-12-12 RX ORDER — IBUPROFEN 600 MG/1
600 TABLET ORAL EVERY 6 HOURS PRN
Qty: 30 TABLET | Refills: 0 | Status: SHIPPED | OUTPATIENT
Start: 2023-12-12

## 2023-12-12 RX ORDER — KETOROLAC TROMETHAMINE 10 MG/1
10 TABLET, FILM COATED ORAL EVERY 6 HOURS PRN
Qty: 20 TABLET | Refills: 0 | Status: SHIPPED | OUTPATIENT
Start: 2023-12-12 | End: 2023-12-12 | Stop reason: ALTCHOICE

## 2023-12-12 RX ORDER — TAMSULOSIN HYDROCHLORIDE 0.4 MG/1
0.4 CAPSULE ORAL ONCE
Status: COMPLETED | OUTPATIENT
Start: 2023-12-12 | End: 2023-12-12

## 2023-12-12 RX ADMIN — KETOROLAC TROMETHAMINE 15 MG: 30 INJECTION, SOLUTION INTRAMUSCULAR; INTRAVENOUS at 12:51

## 2023-12-12 RX ADMIN — TAMSULOSIN HYDROCHLORIDE 0.4 MG: 0.4 CAPSULE ORAL at 14:19

## 2023-12-12 NOTE — DISCHARGE INSTRUCTIONS
Follow up with Urology within 7 days, call as soon as possible to schedule appointment. Strain urine. Return to ED for new or worsening symptoms as discussed.

## 2023-12-12 NOTE — ED PROVIDER NOTES
History  Chief Complaint   Patient presents with    Flank Pain     R sided flank pain that radiates toward R lower abd - some burning with urination last week      18-year-old female past medical history of asthma presenting to emergency department complaining of right flank pain x 1 day. Reports previous dysuria, none currently.       History provided by:  Patient  Flank Pain  Pain location:  R flank  Pain quality comment:  Colicky  Pain radiates to:  Suprapubic region  Duration:  1 day  Associated symptoms: dysuria    Associated symptoms: no chest pain, no chills, no diarrhea, no fever, no hematemesis, no hematochezia, no hematuria, no nausea, no shortness of breath, no vaginal bleeding, no vaginal discharge and no vomiting        Prior to Admission Medications   Prescriptions Last Dose Informant Patient Reported? Taking?   Prenatal Vit-Fe Fumarate-FA (Prenatal Complete) 14-0.4 MG TABS   No No   Sig: Take 1 tablet by mouth in the morning   acetaminophen (TYLENOL) 325 mg tablet   No No   Sig: Take 2 tablets (650 mg total) by mouth every 4 (four) hours as needed for mild pain   benzocaine-menthol-lanolin-aloe (DERMOPLAST) 20-0.5 % topical spray   No No   Sig: Apply 1 Application topically every 6 (six) hours as needed for mild pain or irritation   Patient not taking: Reported on 2023   etonogestrel (NEXPLANON) subdermal implant   Yes No   Si mg by Subdermal route once   ferrous sulfate 324 (65 Fe) mg   No No   Sig: Take 1 tablet (324 mg total) by mouth 2 (two) times a day before meals   Patient not taking: Reported on 2023   ibuprofen (MOTRIN) 600 mg tablet   No No   Sig: Take 1 tablet (600 mg total) by mouth every 6 (six) hours   witch hazel-glycerin (TUCKS) topical pad   No No   Sig: Apply 1 Pad topically every 4 (four) hours as needed for irritation   Patient not taking: Reported on 2023      Facility-Administered Medications: None       Past Medical History:   Diagnosis Date    Asthma         History reviewed. No pertinent surgical history.    Family History   Problem Relation Age of Onset    Hypertension Mother     Diabetes Maternal Grandmother     Cancer Maternal Grandmother      I have reviewed and agree with the history as documented.    E-Cigarette/Vaping    E-Cigarette Use Current Every Day User      E-Cigarette/Vaping Substances    Nicotine Yes     THC No     CBD No     Flavoring No     Other No     Unknown No      Social History     Tobacco Use    Smoking status: Never     Passive exposure: Never    Smokeless tobacco: Never   Vaping Use    Vaping status: Every Day    Substances: Nicotine   Substance Use Topics    Alcohol use: Not Currently    Drug use: Not Currently       Review of Systems   Constitutional:  Negative for chills and fever.   Respiratory:  Negative for shortness of breath.    Cardiovascular:  Negative for chest pain.   Gastrointestinal:  Positive for abdominal pain. Negative for abdominal distention, diarrhea, hematemesis, hematochezia, nausea and vomiting.   Genitourinary:  Positive for dysuria, flank pain and frequency. Negative for difficulty urinating, hematuria, pelvic pain, urgency, vaginal bleeding and vaginal discharge.   Musculoskeletal:  Negative for back pain.   Skin:  Negative for color change and rash.   All other systems reviewed and are negative.      Physical Exam  Physical Exam  Vitals and nursing note reviewed.   Constitutional:       General: She is awake. She is in acute distress.      Appearance: Normal appearance. She is not ill-appearing, toxic-appearing or diaphoretic.      Comments: Difficulty sitting still,    HENT:      Head: Normocephalic.      Mouth/Throat:      Lips: Pink.      Mouth: Mucous membranes are moist.   Eyes:      General: Vision grossly intact. No scleral icterus.     Conjunctiva/sclera: Conjunctivae normal.   Cardiovascular:      Rate and Rhythm: Normal rate and regular rhythm.      Heart sounds: Normal heart sounds.   Pulmonary:       Effort: Pulmonary effort is normal. No respiratory distress.      Breath sounds: Normal breath sounds. No stridor. No wheezing, rhonchi or rales.   Abdominal:      General: There is no distension.      Palpations: Abdomen is soft.      Tenderness: There is no abdominal tenderness. There is no right CVA tenderness, left CVA tenderness or guarding.   Skin:     General: Skin is warm and dry.      Capillary Refill: Capillary refill takes less than 2 seconds.      Coloration: Skin is not jaundiced.      Findings: No rash.   Neurological:      Mental Status: She is alert.      Gait: Gait normal.   Psychiatric:         Behavior: Behavior is cooperative.         Vital Signs  ED Triage Vitals [12/12/23 1148]   Temperature Pulse Respirations Blood Pressure SpO2   98.2 °F (36.8 °C) 94 20 133/89 100 %      Temp Source Heart Rate Source Patient Position - Orthostatic VS BP Location FiO2 (%)   Oral Monitor Sitting Left arm --      Pain Score       --           Vitals:    12/12/23 1148   BP: 133/89   Pulse: 94   Patient Position - Orthostatic VS: Sitting         Visual Acuity      ED Medications  Medications   ketorolac (TORADOL) injection 15 mg (15 mg Intramuscular Given 12/12/23 1251)   tamsulosin (FLOMAX) capsule 0.4 mg (0.4 mg Oral Given 12/12/23 1419)       Diagnostic Studies  Results Reviewed       Procedure Component Value Units Date/Time    Chlamydia/GC amplified DNA by PCR [[id number]]  (Normal) Collected: 12/12/23 1451    Lab Status: Final result Specimen: Urine, Other Updated: 12/13/23 1128     N gonorrhoeae, DNA Probe Negative     Chlamydia trachomatis, DNA Probe Negative    Narrative:      This test was performed using the FDA-approved Jazmín 6800 CT/NG assay ([employer]). This test uses real-time PCR to detect Chlamydia trachomatis (CT) and Neisseria gonorrhoeae (NG). This instrument and assay have been validated by the  and performing laboratory and verified by the performing laboratory.  This test  is intended as an aid in the diagnosis of chlamydial and gonococcal disease. This test has not been evaluated in patients younger than 14 years of age and is not recommended for evaluation of suspected sexual abuse. This assay is not intended to replace other exams or tests for diagnosis of urogenital infection by causative factors other than Chalmydia trachomatis (CT) and Neisseria gonorrhoeae (NG). Additional testing is recommended when the results do not correlate with clinical signs and symptoms.   Procedural Limitations  This assay has only been validated for use with male and female urine, clinician-instructed self-collected vaginal swab specimens, clinician-collected vaginal swab specimens, endocervical swab specimens collected in suman® PCR Media and cervical specimens collected in PreservCyt® Solution. Assay performance has not been validated for use with other collection media and/or specimen types.   Detection of C. trachomatis and N. gonorrhoeaea is dependent on the number of organisms present in the specimen. Detection may be affected by specimen collection methods, patient factors, stage of infection, infecting strains, and presence of polymerase/PCR inhibitors.   When CT is present at very high concentrations, the detection of NG present at concentrations near the limit of detection may be impacted.  The presence of mucus in endocervical specimens may lead to false negative results.  The presence of whole blood in urine and cervical specimens collected in PreservCyt Solution may lead to false negative and/or invalid test results.   Urine testing is recommended to be performed on first catch urine samples. The effects of other collection variables have not been evaluated at this time. The effects of vaginal discharge, tampon use, douching, and other collection variables have not been evaluated at this time.   This assay has not been evaluated with patients currently being treated with antimicrobial  agents active against CT or NG, or patients with a history of hysterectomy.     Urine Microscopic [[id number]]  (Abnormal) Collected: 12/12/23 1451    Lab Status: Final result Specimen: Urine, Clean Catch Updated: 12/12/23 1521     RBC, UA 10-20 /hpf      WBC, UA 2-4 /hpf      Epithelial Cells Moderate /hpf      Bacteria, UA Moderate /hpf      AMORPH URATES Moderate /hpf      MUCUS THREADS Moderate    UA w Reflex to Microscopic w Reflex to Culture [[id number]]  (Abnormal) Collected: 12/12/23 1451    Lab Status: Final result Specimen: Urine, Clean Catch Updated: 12/12/23 1509     Color, UA Chary     Clarity, UA Slightly Cloudy     Specific Gravity, UA 1.030     pH, UA 5.0     Leukocytes, UA 25.0     Nitrite, UA Negative     Protein, UA 30 (1+) mg/dl      Glucose, UA Negative mg/dl      Ketones, UA 5 (Trace) mg/dl      Bilirubin, UA Negative     Occult Blood, .0     UROBILINOGEN UA Negative mg/dL     Basic metabolic panel [[id number]] Collected: 12/12/23 1357    Lab Status: Final result Specimen: Blood from Arm, Left Updated: 12/12/23 1417     Sodium 137 mmol/L      Potassium 4.0 mmol/L      Chloride 106 mmol/L      CO2 21 mmol/L      ANION GAP 10 mmol/L      BUN 16 mg/dL      Creatinine 0.67 mg/dL      Glucose 97 mg/dL      Calcium 9.4 mg/dL      eGFR 126 ml/min/1.73sq m     Narrative:      National Kidney Disease Foundation guidelines for Chronic Kidney Disease (CKD):     Stage 1 with normal or high GFR (GFR > 90 mL/min/1.73 square meters)    Stage 2 Mild CKD (GFR = 60-89 mL/min/1.73 square meters)    Stage 3A Moderate CKD (GFR = 45-59 mL/min/1.73 square meters)    Stage 3B Moderate CKD (GFR = 30-44 mL/min/1.73 square meters)    Stage 4 Severe CKD (GFR = 15-29 mL/min/1.73 square meters)    Stage 5 End Stage CKD (GFR <15 mL/min/1.73 square meters)  Note: GFR calculation is accurate only with a steady state creatinine    CBC and differential [[id number]]  (Abnormal) Collected: 12/12/23 7057    Lab Status: Final  result Specimen: Blood from Arm, Left Updated: 12/12/23 1403     WBC 12.51 Thousand/uL      RBC 4.90 Million/uL      Hemoglobin 13.8 g/dL      Hematocrit 40.3 %      MCV 82 fL      MCH 28.2 pg      MCHC 34.2 g/dL      RDW 12.1 %      MPV 10.1 fL      Platelets 216 Thousands/uL      nRBC 0 /100 WBCs      Neutrophils Relative 84 %      Immat GRANS % 0 %      Lymphocytes Relative 11 %      Monocytes Relative 4 %      Eosinophils Relative 1 %      Basophils Relative 0 %      Neutrophils Absolute 10.49 Thousands/µL      Immature Grans Absolute 0.04 Thousand/uL      Lymphocytes Absolute 1.35 Thousands/µL      Monocytes Absolute 0.52 Thousand/µL      Eosinophils Absolute 0.08 Thousand/µL      Basophils Absolute 0.03 Thousands/µL     POCT pregnancy, urine [[id number]]  (Normal) Resulted: 12/12/23 1212    Lab Status: Final result Updated: 12/12/23 1212     EXT Preg Test, Ur Negative     Control Valid                   CT renal stone study abdomen pelvis without contrast   Final Result by Jose Carver MD (12/12 1319)      Right ureterovesical junction region calculus, 4 mm. Moderate hydronephrosis and perinephric stranding.      Workstation performed: [id number]                    Procedures  Procedures         ED Course  ED Course as of 12/13/23 2052   Tue Dec 12, 2023   1325 CT renal stone study abdomen pelvis without contrast  IMPRESSION:     Right ureterovesical junction region calculus, 4 mm. Moderate hydronephrosis and perinephric stranding.     1347 Reports great improvement in pain.    1522 WBC, UA: 2-4  Not consistent with UTI                                              Medical Decision Making  Ddx includes but is not limited to nephrolithiasis, UTI. VSS. Afebrile. Benign, reassuring abdominal exam. Tolerating PO without difficulty.  Give urine hCG.  Resolution of pain with Toradol.  CT findings showing 4 mm stone at UVJ with hydronephrosis. Normal kidney function on BMP. Contaminated UA with same amount of  "bacteria as epithelial cells, and only 2-4 wbc- not consistent with UTI. No overt indication for antibiotic. Pain is well controlled. Urology follow up given. No acute indications for hospitalization.     All imaging and/or lab testing discussed with patient, strict return to ED precautions discussed. Patient recommended to follow up promptly with appropriate outpatient provider. Patient and/or family members verbalizes understanding and agrees with plan. Patient and/or family members were given opportunity to ask questions, all questions were answered at this time. Patient is stable for discharge.     Portions of the record may have been created with voice recognition software. Occasional wrong word or \"sound a like\" substitutions may have occurred due to the inherent limitations of voice recognition software. Read the chart carefully and recognize, using context, where substitutions have occurred.         Amount and/or Complexity of Data Reviewed  Labs: ordered. Decision-making details documented in ED Course.  Radiology: ordered. Decision-making details documented in ED Course.    Risk  Prescription drug management.             Disposition  Final diagnoses:   Ureterolithiasis   Hydronephrosis     Time reflects when diagnosis was documented in both MDM as applicable and the Disposition within this note       Time User Action Codes Description Comment    12/12/2023  2:36 PM Viviane Bowman Add [N20.1] Ureterolithiasis     12/12/2023  2:36 PM Viviane Bowman Add [N13.30] Hydronephrosis           ED Disposition       ED Disposition   Discharge    Condition   Stable    Date/Time   Tue Dec 12, 2023  3:23 PM    Comment   Teodora Gastelum discharge to home/self care.                   Follow-up Information       Follow up With Specialties Details Why Contact Info Additional Information    [facility] Schedule an appointment as soon as possible for a visit in 1 week For follow up regarding your " symptoms [address] [place] [address] [address]  [facility] [id number]  [phone] [facility] [address], [place], [place], [address]   [phone]            Discharge Medication List as of 12/12/2023  3:24 PM        START taking these medications    Details   !! ibuprofen (MOTRIN) 600 mg tablet Take 1 tablet (600 mg total) by mouth every 6 (six) hours as needed for moderate pain, Starting Tue 12/12/2023, Normal      tamsulosin (FLOMAX) 0.4 mg Take 1 capsule (0.4 mg total) by mouth daily with dinner for 14 days, Starting Tue 12/12/2023, Until Tue 12/26/2023, Normal       !! - Potential duplicate medications found. Please discuss with provider.        CONTINUE these medications which have NOT CHANGED    Details   acetaminophen (TYLENOL) 325 mg tablet Take 2 tablets (650 mg total) by mouth every 4 (four) hours as needed for mild pain, Starting Tue 6/27/2023, No Print      benzocaine-menthol-lanolin-aloe (DERMOPLAST) 20-0.5 % topical spray Apply 1 Application topically every 6 (six) hours as needed for mild pain or irritation, Starting Tue 6/27/2023, No Print      etonogestrel (NEXPLANON) subdermal implant 68 mg by Subdermal route once, Starting Tue 6/27/2023, Historical Med      ferrous sulfate 324 (65 Fe) mg Take 1 tablet (324 mg total) by mouth 2 (two) times a day before meals, Starting Thu 5/25/2023, Until Wed 8/23/2023, Normal      !! ibuprofen (MOTRIN) 600 mg tablet Take 1 tablet (600 mg total) by mouth every 6 (six) hours, Starting Tue 6/27/2023, No Print      Prenatal Vit-Fe Fumarate-FA (Prenatal Complete) 14-0.4 MG TABS Take 1 tablet by mouth in the morning, Starting Mon 10/24/2022, Normal      witch hazel-glycerin (TUCKS) topical pad Apply 1 Pad topically every 4 (four) hours as needed for irritation, Starting Tue 6/27/2023, No Print       !! - Potential duplicate medications found. Please discuss with provider.          No discharge  procedures on file.    PDMP Review       None            ED Provider  Electronically Signed by             Viviane Bowman PA-C  12/13/23 2052

## 2023-12-13 ENCOUNTER — NURSE TRIAGE (OUTPATIENT)
Age: 18
End: 2023-12-13

## 2023-12-13 LAB
C TRACH DNA SPEC QL NAA+PROBE: NEGATIVE
N GONORRHOEA DNA SPEC QL NAA+PROBE: NEGATIVE

## 2023-12-13 NOTE — TELEPHONE ENCOUNTER
Regarding: Right ureterovesical junction region calculus, 4 mm. Moderate hydronephrosis  ----- Message from Alecia Mello sent at 12/13/2023  3:05 PM EST -----  Patient went to the ER yesterday. They prescribed Flomax. Still has chills, frequency, right low back pain that rotates to the front. Urine sometimes is darker in color. Decision tree says she needs to be seen this week. IMPRESSION:     Right ureterovesical junction region calculus, 4 mm.  Moderate hydronephrosis and perinephric stranding

## 2023-12-28 ENCOUNTER — OFFICE VISIT (OUTPATIENT)
Dept: UROLOGY | Facility: AMBULATORY SURGERY CENTER | Age: 18
End: 2023-12-28
Payer: COMMERCIAL

## 2023-12-28 VITALS
SYSTOLIC BLOOD PRESSURE: 112 MMHG | OXYGEN SATURATION: 98 % | DIASTOLIC BLOOD PRESSURE: 80 MMHG | HEART RATE: 89 BPM | BODY MASS INDEX: 33.84 KG/M2 | WEIGHT: 185 LBS

## 2023-12-28 DIAGNOSIS — N20.0 NEPHROLITHIASIS: ICD-10-CM

## 2023-12-28 DIAGNOSIS — N13.2 HYDRONEPHROSIS CONCURRENT WITH AND DUE TO CALCULI OF KIDNEY AND URETER: Primary | ICD-10-CM

## 2023-12-28 LAB
SL AMB  POCT GLUCOSE, UA: NORMAL
SL AMB LEUKOCYTE ESTERASE,UA: NORMAL
SL AMB POCT BILIRUBIN,UA: NORMAL
SL AMB POCT BLOOD,UA: NORMAL
SL AMB POCT CLARITY,UA: NORMAL
SL AMB POCT COLOR,UA: NORMAL
SL AMB POCT KETONES,UA: NORMAL
SL AMB POCT NITRITE,UA: NORMAL
SL AMB POCT PH,UA: 6
SL AMB POCT SPECIFIC GRAVITY,UA: 1.03
SL AMB POCT URINE PROTEIN: NORMAL
SL AMB POCT UROBILINOGEN: 0.2

## 2023-12-28 PROCEDURE — 81002 URINALYSIS NONAUTO W/O SCOPE: CPT | Performed by: UROLOGY

## 2023-12-28 PROCEDURE — 99204 OFFICE O/P NEW MOD 45 MIN: CPT | Performed by: UROLOGY

## 2023-12-28 NOTE — PROGRESS NOTES
12/28/2023    Teodora Gastelum  2005  [id number]        Assessment  Ureteral calculus, spontaneously passed    Plan  As this is her first episode, we discussed no intervention necessary at this time.  Recommendation is for significant hydration with water at least 2 L/day.  She has a baby but is not breast-feeding.  She is willing to be compliant with hydration recommendations.  No further recommendations necessary.  She may follow-up on as-needed basis if she develops renal colic and in the future.  If she becomes recurrent stone former, would then consider metabolic testing.    All questions answered and they are happy with the plan.          History of Present Illness  Teodora is a 18 y.o. female who complains of severe right renal colic.  CT scan revealed 4 mm right ureteral calculus distally on 12/12/2023.  Conservative management was recommended.  She is seen today for evaluation.  She reports that just yesterday she passed a stone spontaneously.  She forgot to bring it in.  She did analyze her cell and said it was a small hard stone.  Pain has completely resolved.    She does have family history of stones in her grandmother.  Otherwise no significant history.  She stopped drinking sodas and drinks plenty of water now.            Review of Systems  Review of Systems   Constitutional: Negative.    HENT: Negative.     Respiratory: Negative.     Cardiovascular: Negative.    Gastrointestinal: Negative.    Genitourinary:         As per HPI   Musculoskeletal: Negative.    Skin: Negative.    Neurological: Negative.    Hematological: Negative.          Past Medical History  Past Medical History:   Diagnosis Date    Asthma        Past Social History  History reviewed. No pertinent surgical history.    Past Family History  Family History   Problem Relation Age of Onset    Hypertension Mother     Diabetes Maternal Grandmother     Cancer Maternal Grandmother        Past Social history  Social History      Socioeconomic History    Marital status: Unknown     Spouse name: Not on file    Number of children: Not on file    Years of education: Not on file    Highest education level: Not on file   Occupational History    Not on file   Tobacco Use    Smoking status: Never     Passive exposure: Never    Smokeless tobacco: Never   Vaping Use    Vaping status: Every Day    Substances: Nicotine   Substance and Sexual Activity    Alcohol use: Not Currently    Drug use: Not Currently    Sexual activity: Yes     Partners: Male   Other Topics Concern    Not on file   Social History Narrative    Not on file     Social Determinants of Health     Financial Resource Strain: Low Risk  (8/2/2023)    Overall Financial Resource Strain (CARDIA)     Difficulty of Paying Living Expenses: Not hard at all   Food Insecurity: No Food Insecurity (8/2/2023)    Hunger Vital Sign     Worried About Running Out of Food in the Last Year: Never true     Ran Out of Food in the Last Year: Never true   Transportation Needs: No Transportation Needs (8/2/2023)    PRAPARE - Transportation     Lack of Transportation (Medical): No     Lack of Transportation (Non-Medical): No   Physical Activity: Not on file   Stress: Not on file   Social Connections: Not on file   Intimate Partner Violence: Not on file   Housing Stability: Unknown (8/2/2023)    Housing Stability Vital Sign     Unable to Pay for Housing in the Last Year: No     Number of Places Lived in the Last Year: Not on file     Unstable Housing in the Last Year: No     Social History     Tobacco Use   Smoking Status Never    Passive exposure: Never   Smokeless Tobacco Never       Current Medications  Current Outpatient Medications   Medication Sig Dispense Refill    acetaminophen (TYLENOL) 325 mg tablet Take 2 tablets (650 mg total) by mouth every 4 (four) hours as needed for mild pain  0    etonogestrel (NEXPLANON) subdermal implant 68 mg by Subdermal route once      ibuprofen (MOTRIN) 600 mg tablet  Take 1 tablet (600 mg total) by mouth every 6 (six) hours 30 tablet 0    ibuprofen (MOTRIN) 600 mg tablet Take 1 tablet (600 mg total) by mouth every 6 (six) hours as needed for moderate pain 30 tablet 0    tamsulosin (FLOMAX) 0.4 mg Take 1 capsule (0.4 mg total) by mouth daily with dinner for 14 days 14 capsule 0    benzocaine-menthol-lanolin-aloe (DERMOPLAST) 20-0.5 % topical spray Apply 1 Application topically every 6 (six) hours as needed for mild pain or irritation (Patient not taking: Reported on 7/5/2023)  0    ferrous sulfate 324 (65 Fe) mg Take 1 tablet (324 mg total) by mouth 2 (two) times a day before meals (Patient not taking: Reported on 8/2/2023) 60 tablet 3    Prenatal Vit-Fe Fumarate-FA (Prenatal Complete) 14-0.4 MG TABS Take 1 tablet by mouth in the morning (Patient not taking: Reported on 12/28/2023) 30 tablet 0    witch hazel-glycerin (TUCKS) topical pad Apply 1 Pad topically every 4 (four) hours as needed for irritation (Patient not taking: Reported on 7/5/2023)  0     No current facility-administered medications for this visit.       Allergies  No Known Allergies    Past Medical History, Social History, Family History, medications and allergies were reviewed.    Vitals  Vitals:    12/28/23 0803   BP: 112/80   BP Location: Left arm   Patient Position: Sitting   Cuff Size: Large   Pulse: 89   SpO2: 98%   Weight: 83.9 kg (185 lb)       Physical Exam  Physical Exam  Vitals reviewed.   Constitutional:       Appearance: She is well-developed.   HENT:      Head: Normocephalic and atraumatic.   Eyes:      Conjunctiva/sclera: Conjunctivae normal.   Cardiovascular:      Rate and Rhythm: Normal rate.   Pulmonary:      Effort: Pulmonary effort is normal.   Abdominal:      Palpations: Abdomen is soft.   Genitourinary:     Comments: No CVAT.  Musculoskeletal:         General: Normal range of motion.   Skin:     General: Skin is warm and dry.   Neurological:      Mental Status: She is alert and oriented to  "person, place, and time.   Psychiatric:         Mood and Affect: Mood normal.           Results  No results found for: \"PSA\"  Lab Results   Component Value Date    CALCIUM 9.4 12/12/2023    K 4.0 12/12/2023    CO2 21 12/12/2023     12/12/2023    BUN 16 12/12/2023    CREATININE 0.67 12/12/2023     Lab Results   Component Value Date    WBC 12.51 (H) 12/12/2023    HGB 13.8 12/12/2023    HCT 40.3 12/12/2023    MCV 82 12/12/2023     12/12/2023           "

## 2024-03-13 ENCOUNTER — HOSPITAL ENCOUNTER (EMERGENCY)
Facility: HOSPITAL | Age: 19
Discharge: HOME | End: 2024-03-13
Attending: EMERGENCY MEDICINE | Admitting: EMERGENCY MEDICINE
Payer: COMMERCIAL

## 2024-03-13 VITALS
SYSTOLIC BLOOD PRESSURE: 128 MMHG | RESPIRATION RATE: 20 BRPM | DIASTOLIC BLOOD PRESSURE: 97 MMHG | TEMPERATURE: 98.2 F | OXYGEN SATURATION: 97 % | HEART RATE: 101 BPM

## 2024-03-13 DIAGNOSIS — R11.2 NAUSEA VOMITING AND DIARRHEA: ICD-10-CM

## 2024-03-13 DIAGNOSIS — R10.13 EPIGASTRIC PAIN: Primary | ICD-10-CM

## 2024-03-13 DIAGNOSIS — R19.7 NAUSEA VOMITING AND DIARRHEA: ICD-10-CM

## 2024-03-13 LAB
EXT PREGNANCY TEST URINE: NEGATIVE
EXT. CONTROL: NORMAL
FLUAV RNA RESP QL NAA+PROBE: NEGATIVE
FLUBV RNA RESP QL NAA+PROBE: NEGATIVE
RSV RNA RESP QL NAA+PROBE: NEGATIVE
SARS-COV-2 RNA RESP QL NAA+PROBE: NEGATIVE

## 2024-03-13 PROCEDURE — 81025 URINE PREGNANCY TEST: CPT

## 2024-03-13 PROCEDURE — 99284 EMERGENCY DEPT VISIT MOD MDM: CPT

## 2024-03-13 PROCEDURE — 0241U HB NFCT DS VIR RESP RNA 4 TRGT: CPT

## 2024-03-13 RX ORDER — PANTOPRAZOLE SODIUM 40 MG/1
40 TABLET, DELAYED RELEASE ORAL ONCE
Status: COMPLETED | OUTPATIENT
Start: 2024-03-13 | End: 2024-03-13

## 2024-03-13 RX ORDER — ALUMINA, MAGNESIA, AND SIMETHICONE 2400; 2400; 240 MG/30ML; MG/30ML; MG/30ML
10 SUSPENSION ORAL EVERY 6 HOURS PRN
Qty: 355 ML | Refills: 0 | Status: SHIPPED | OUTPATIENT
Start: 2024-03-13

## 2024-03-13 RX ORDER — SUCRALFATE 1 G/1
1 TABLET ORAL EVERY 6 HOURS PRN
Qty: 20 TABLET | Refills: 0 | Status: SHIPPED | OUTPATIENT
Start: 2024-03-13 | End: 2024-03-18

## 2024-03-13 RX ORDER — SUCRALFATE 1 G/1
1 TABLET ORAL EVERY 6 HOURS PRN
Qty: 20 TABLET | Refills: 0 | Status: SHIPPED | OUTPATIENT
Start: 2024-03-13 | End: 2024-03-13

## 2024-03-13 RX ORDER — MAGNESIUM HYDROXIDE/ALUMINUM HYDROXICE/SIMETHICONE 120; 1200; 1200 MG/30ML; MG/30ML; MG/30ML
30 SUSPENSION ORAL ONCE
Status: COMPLETED | OUTPATIENT
Start: 2024-03-13 | End: 2024-03-13

## 2024-03-13 RX ORDER — ONDANSETRON 4 MG/1
4 TABLET, ORALLY DISINTEGRATING ORAL ONCE
Status: COMPLETED | OUTPATIENT
Start: 2024-03-13 | End: 2024-03-13

## 2024-03-13 RX ORDER — SUCRALFATE 1 G/1
1 TABLET ORAL ONCE
Status: COMPLETED | OUTPATIENT
Start: 2024-03-13 | End: 2024-03-13

## 2024-03-13 RX ORDER — ONDANSETRON 4 MG/1
4 TABLET, FILM COATED ORAL EVERY 6 HOURS PRN
Qty: 20 TABLET | Refills: 0 | Status: SHIPPED | OUTPATIENT
Start: 2024-03-13

## 2024-03-13 RX ADMIN — ONDANSETRON 4 MG: 4 TABLET, ORALLY DISINTEGRATING ORAL at 06:13

## 2024-03-13 RX ADMIN — SUCRALFATE 1 G: 1 TABLET ORAL at 06:34

## 2024-03-13 RX ADMIN — PANTOPRAZOLE SODIUM 40 MG: 40 TABLET, DELAYED RELEASE ORAL at 06:34

## 2024-03-13 RX ADMIN — ALUMINUM HYDROXIDE, MAGNESIUM HYDROXIDE, AND DIMETHICONE 30 ML: 200; 20; 200 SUSPENSION ORAL at 06:34

## 2024-03-13 NOTE — ED PROVIDER NOTES
History  Chief Complaint   Patient presents with    Vomiting     Reports vomiting with diarrhea during the night with generalized body aches. Was exposed to someone who was sick yesterday.     The patient is a 18-year-old female with a past medical history of asthma, who presents for evaluation of abdominal pain.  She reports developing lightheadedness at work yesterday but this resolved and she went home to go to sleep.  She awoke around 1 AM with nausea, vomiting, diarrhea, and generalized myalgias.  No blood in the vomit or stool.  Shortly after the vomiting began she also developed upper abdominal pain.  No urinary symptoms, cough, congestion, sore throat, headaches, rashes, or F/C.  Patient states she was around a coworker who was sick yesterday.  No medications taken PTA.        Prior to Admission Medications   Prescriptions Last Dose Informant Patient Reported? Taking?   Prenatal Vit-Fe Fumarate-FA (Prenatal Complete) 14-0.4 MG TABS  Self No No   Sig: Take 1 tablet by mouth in the morning   Patient not taking: Reported on 2023   acetaminophen (TYLENOL) 325 mg tablet  Self No No   Sig: Take 2 tablets (650 mg total) by mouth every 4 (four) hours as needed for mild pain   benzocaine-menthol-lanolin-aloe (DERMOPLAST) 20-0.5 % topical spray  Self No No   Sig: Apply 1 Application topically every 6 (six) hours as needed for mild pain or irritation   Patient not taking: Reported on 2023   etonogestrel (NEXPLANON) subdermal implant  Self Yes No   Si mg by Subdermal route once   ferrous sulfate 324 (65 Fe) mg   No No   Sig: Take 1 tablet (324 mg total) by mouth 2 (two) times a day before meals   Patient not taking: Reported on 2023   ibuprofen (MOTRIN) 600 mg tablet  Self No No   Sig: Take 1 tablet (600 mg total) by mouth every 6 (six) hours   ibuprofen (MOTRIN) 600 mg tablet  Self No No   Sig: Take 1 tablet (600 mg total) by mouth every 6 (six) hours as needed for moderate pain   tamsulosin (FLOMAX)  0.4 mg   No No   Sig: Take 1 capsule (0.4 mg total) by mouth daily with dinner for 14 days   witch hazel-glycerin (TUCKS) topical pad  Self No No   Sig: Apply 1 Pad topically every 4 (four) hours as needed for irritation   Patient not taking: Reported on 7/5/2023      Facility-Administered Medications: None       Past Medical History:   Diagnosis Date    Asthma        History reviewed. No pertinent surgical history.    Family History   Problem Relation Age of Onset    Hypertension Mother     Diabetes Maternal Grandmother     Cancer Maternal Grandmother      I have reviewed and agree with the history as documented.    E-Cigarette/Vaping    E-Cigarette Use Current Every Day User      E-Cigarette/Vaping Substances    Nicotine Yes     THC No     CBD No     Flavoring No     Other No     Unknown No      Social History     Tobacco Use    Smoking status: Never     Passive exposure: Never    Smokeless tobacco: Never   Vaping Use    Vaping status: Every Day    Substances: Nicotine   Substance Use Topics    Alcohol use: Not Currently    Drug use: Yes     Types: Marijuana       Review of Systems   Constitutional:  Negative for appetite change, chills, fatigue and fever.   HENT:  Negative for congestion, ear pain, rhinorrhea and sore throat.    Eyes:  Negative for pain and visual disturbance.   Respiratory:  Negative for cough and shortness of breath.    Cardiovascular:  Negative for chest pain and palpitations.   Gastrointestinal:  Positive for abdominal pain, diarrhea, nausea and vomiting. Negative for blood in stool and constipation.   Genitourinary:  Negative for dysuria and hematuria.   Musculoskeletal:  Positive for back pain and myalgias. Negative for arthralgias.   Skin:  Negative for color change and rash.   Neurological:  Positive for light-headedness. Negative for dizziness, seizures, syncope, weakness and headaches.   All other systems reviewed and are negative.      Physical Exam  Physical Exam  Vitals and nursing  note reviewed.   Constitutional:       General: She is awake.      Appearance: She is well-developed. She is obese. She is not toxic-appearing or diaphoretic.   HENT:      Head: Normocephalic and atraumatic.      Right Ear: Tympanic membrane, ear canal and external ear normal.      Left Ear: Tympanic membrane, ear canal and external ear normal.      Nose: Nose normal. No congestion or rhinorrhea.      Mouth/Throat:      Lips: Pink.      Mouth: Mucous membranes are moist. No oral lesions.      Pharynx: Oropharynx is clear. Uvula midline. No pharyngeal swelling, oropharyngeal exudate or posterior oropharyngeal erythema.   Eyes:      General: Lids are normal. Vision grossly intact. Gaze aligned appropriately. No scleral icterus.     Conjunctiva/sclera: Conjunctivae normal.      Pupils: Pupils are equal, round, and reactive to light.   Cardiovascular:      Rate and Rhythm: Regular rhythm.      Heart sounds: S1 normal and S2 normal. No murmur heard.     No friction rub. No gallop.   Pulmonary:      Effort: Pulmonary effort is normal. No respiratory distress.      Breath sounds: Normal breath sounds and air entry. No wheezing, rhonchi or rales.   Abdominal:      General: Abdomen is flat. Bowel sounds are increased. There is no distension.      Palpations: Abdomen is soft. There is no mass.      Tenderness: There is abdominal tenderness in the epigastric area. There is no right CVA tenderness, left CVA tenderness, guarding or rebound.   Musculoskeletal:      Cervical back: Normal, full passive range of motion without pain and neck supple. No rigidity or crepitus. No spinous process tenderness or muscular tenderness.      Thoracic back: Tenderness present. No spasms or bony tenderness.      Lumbar back: Tenderness present. No spasms or bony tenderness.      Comments: Generalized tenderness to palpation of the thoracolumbar back, without point tenderness of the spine.   Lymphadenopathy:      Cervical: No cervical adenopathy.    Skin:     General: Skin is warm.      Capillary Refill: Capillary refill takes less than 2 seconds.      Coloration: Skin is not jaundiced or pale.      Findings: No rash.   Neurological:      General: No focal deficit present.      Mental Status: She is alert and oriented to person, place, and time.      Sensory: Sensation is intact.      Motor: Motor function is intact.      Coordination: Coordination is intact.      Gait: Gait is intact.   Psychiatric:         Attention and Perception: Attention normal.         Speech: Speech normal.         Behavior: Behavior normal. Behavior is cooperative.         Vital Signs  ED Triage Vitals [03/13/24 0554]   Temperature Pulse Respirations Blood Pressure SpO2   98.2 °F (36.8 °C) 101 20 128/97 97 %      Temp Source Heart Rate Source Patient Position - Orthostatic VS BP Location FiO2 (%)   Tympanic Monitor Sitting Left arm --      Pain Score       --           Vitals:    03/13/24 0554   BP: 128/97   Pulse: 101   Patient Position - Orthostatic VS: Sitting       ED Medications  Medications   ondansetron (ZOFRAN-ODT) dispersible tablet 4 mg (4 mg Oral Given 3/13/24 0613)   aluminum-magnesium hydroxide-simethicone (MAALOX) oral suspension 30 mL (30 mL Oral Given 3/13/24 0634)   pantoprazole (PROTONIX) EC tablet 40 mg (40 mg Oral Given 3/13/24 0634)   sucralfate (CARAFATE) tablet 1 g (1 g Oral Given 3/13/24 0634)       Diagnostic Studies  Results Reviewed       Procedure Component Value Units Date/Time    FLU/RSV/COVID - if FLU/RSV clinically relevant [[id number]] Collected: 03/13/24 0611    Lab Status: In process Specimen: Nares from Nose Updated: 03/13/24 0644    POCT pregnancy, urine [[id number]]  (Normal) Resulted: 03/13/24 0630    Lab Status: Final result Updated: 03/13/24 0635     EXT Preg Test, Ur Negative     Control Valid                   No orders to display              Procedures  Procedures         ED Course         CRAFFT      Flowsheet Row Most Recent Value   CRAFFT  "Initial Screen: During the past 12 months, did you:    1. Drink any alcohol (more than a few sips)?  No Filed at: 03/13/2024 0558   2. Smoke any marijuana or hashish Yes Filed at: 03/13/2024 0558   3. Use anything else to get high? (\"anything else\" includes illegal drugs, over the counter and prescription drugs, and things that you sniff or 'ojrgensen')? No Filed at: 03/13/2024 0558   CRAFFT Full Screen: During the past 12 months:    1. Have you ever ridden in a car driven by someone (including yourself) who was \"high\" or had been using alcohol or drugs? 0 Filed at: 03/13/2024 0558   2. Do you ever use alcohol or drugs to relax, feel better about yourself, or fit in? 0 Filed at: 03/13/2024 0558   3. Do you ever use alcohol/drugs while you are by yourself, alone? 0 Filed at: 03/13/2024 0558   4. Do you ever forget things you did while using alcohol or drugs? 0 Filed at: 03/13/2024 0558   5. Do your family or friends ever tell you that you should cut down on your drinking or drug use? 0 Filed at: 03/13/2024 0558   6. Have you gotten into trouble while you were using alcohol or drugs? 0 Filed at: 03/13/2024 0558   CRAFFT Score 0 Filed at: 03/13/2024 0558                Medical Decision Making  The patient presents with N/V/D, abdominal pain, and myalgias.  She is afebrile and nontoxic appearing.  There is mild tenderness to palpation of the epigastric abdomen without rebound or guarding.  Differential diagnosis includes but is not limited to viral illness, gastritis, gastroenteritis, GERD, PUD, food poisoning, or infectious diarrhea; doubt acute intraabdominal surgical pathology.  Viral swab obtained and is pending.  She will be contacted with any positive result.  Patient reported significant relief with a GI cocktail and she successfully passed a p.o. challenge.  Supportive care instructions reviewed and patient is in agreement with the treatment plan.  Strict return precautions discussed and she verbalized understanding. "  Follow-up with PCP, but return to the ED in the interim with new or worsening symptoms.    Problems Addressed:  Epigastric pain: acute illness or injury  Nausea vomiting and diarrhea: acute illness or injury    Amount and/or Complexity of Data Reviewed  Labs: ordered.    Risk  OTC drugs.  Prescription drug management.           Disposition  Final diagnoses:   Epigastric pain   Nausea vomiting and diarrhea     Time reflects when diagnosis was documented in both MDM as applicable and the Disposition within this note       Time User Action Codes Description Comment    3/13/2024  6:22 AM Tayler Lowery Add [R10.13] Epigastric pain     3/13/2024  6:22 AM Tayler Lowery Add [R11.2,  R19.7] Nausea vomiting and diarrhea           ED Disposition       ED Disposition   Discharge    Condition   Stable    Date/Time   Wed Mar 13, 2024 0652    Comment   Teodora Nirav discharge to home/self care.                   Follow-up Information       Follow up With Specialties Details Why Contact Info    Zahra Morgan MD Pediatrics   [address] [facility] [address]  [facility] [address]  [phone]              Patient's Medications   Discharge Prescriptions    ALUMINUM-MAGNESIUM HYDROXIDE-SIMETHICONE (MAALOX MAX) 400-400-40 MG/5ML SUSPENSION    Take 10 mL by mouth every 6 (six) hours as needed for heartburn       Start Date: 3/13/2024 End Date: --       Order Dose: 10 mL       Quantity: 355 mL    Refills: 0    ONDANSETRON (ZOFRAN) 4 MG TABLET    Take 1 tablet (4 mg total) by mouth every 6 (six) hours as needed for nausea or vomiting       Start Date: 3/13/2024 End Date: --       Order Dose: 4 mg       Quantity: 20 tablet    Refills: 0    SUCRALFATE (CARAFATE) 1 G TABLET    Take 1 tablet (1 g total) by mouth every 6 (six) hours as needed (abdominal pain; nausea) for up to 5 days       Start Date: 3/13/2024 End Date: 3/18/2024       Order Dose: 1 g       Quantity: 20 tablet    Refills: 0       No discharge procedures on file.    PDMP  Review       None            ED Provider  Electronically Signed by             Tayler Lowery PA-C  03/13/24 0693

## 2024-03-13 NOTE — Clinical Note
Teodora Gastelum was seen and treated in our emergency department on 3/13/2024.                Diagnosis:     Teodora  may return to school on return date.    She may return on this date: 03/14/2024         If you have any questions or concerns, please don't hesitate to call.      Tayler Lowery PA-C    ______________________________           _______________          _______________  Hospital Representative                              Date                                Time

## 2024-07-17 ENCOUNTER — TELEPHONE (OUTPATIENT)
Dept: OBGYN CLINIC | Facility: CLINIC | Age: 19
End: 2024-07-17

## 2024-09-09 ENCOUNTER — ANNUAL EXAM (OUTPATIENT)
Dept: OBGYN CLINIC | Facility: CLINIC | Age: 19
End: 2024-09-09

## 2024-09-09 VITALS
DIASTOLIC BLOOD PRESSURE: 84 MMHG | BODY MASS INDEX: 34.82 KG/M2 | SYSTOLIC BLOOD PRESSURE: 128 MMHG | HEIGHT: 62 IN | HEART RATE: 93 BPM | WEIGHT: 189.2 LBS

## 2024-09-09 DIAGNOSIS — Z11.3 SCREENING EXAMINATION FOR STD (SEXUALLY TRANSMITTED DISEASE): ICD-10-CM

## 2024-09-09 DIAGNOSIS — Z01.419 ENCOUNTER FOR ANNUAL ROUTINE GYNECOLOGICAL EXAMINATION: Primary | ICD-10-CM

## 2024-09-09 PROBLEM — B95.1 POSITIVE GBS TEST: Status: RESOLVED | Noted: 2023-06-08 | Resolved: 2024-09-09

## 2024-09-09 PROBLEM — Z13.79 GENETIC TESTING: Status: RESOLVED | Noted: 2023-01-06 | Resolved: 2024-09-09

## 2024-09-09 PROCEDURE — 87491 CHLMYD TRACH DNA AMP PROBE: CPT | Performed by: NURSE PRACTITIONER

## 2024-09-09 PROCEDURE — 99395 PREV VISIT EST AGE 18-39: CPT | Performed by: NURSE PRACTITIONER

## 2024-09-09 PROCEDURE — G0145 SCR C/V CYTO,THINLAYER,RESCR: HCPCS | Performed by: NURSE PRACTITIONER

## 2024-09-09 PROCEDURE — 87591 N.GONORRHOEAE DNA AMP PROB: CPT | Performed by: NURSE PRACTITIONER

## 2024-09-09 NOTE — PROGRESS NOTES
Annual Exam    Assessment   1. Encounter for annual routine gynecological examination  Liquid-based pap, screening    CANCELED: Chlamydia/GC amplified DNA by PCR    CANCELED: Chlamydia/GC amplified DNA by PCR      2. Screening examination for STD (sexually transmitted disease)  Chlamydia/GC amplified DNA by PCR        well woman       Plan       All questions answered.  Breast self exam technique reviewed and patient encouraged to perform self-exam monthly.  Chlamydia specimen.  Contraception: Nexplanon.  Discussed healthy lifestyle modifications.  Follow up in 1 year.  GC specimen.  Thin prep Pap smear.     Patient Instructions   PAP and STD results can take up to 2 weeks  Remember safe sex and condom use  HOPE Line [phone]  Call with needs or concerns  Return in 1 year  Pt verbalized understanding of all discussed.      Subjective      Teodora Gastelum is a 19 y.o.  female who presents for annual well woman exam. Periods are irregular with Nexplanon, lasting a few days. No intermenstrual bleeding, spotting, or discharge.  First PAP today  1 partner x 1 year, denies domestic violence  Pt states she thinks she had HPV vaccines when she was growing up    Depression Screening Follow-up Plan: Patient's depression screening was negative with a PHQ-2 score of 0. Their PHQ-9 score was 1. Clinically patient does not have depression. No treatment is required.    BMI Counseling: Body mass index is 34.61 kg/m². The BMI is above normal. Nutrition recommendations include reducing portion sizes, decreasing overall calorie intake, 3-5 servings of fruits/vegetables daily, consuming healthier snacks, moderation in carbohydrate intake, increasing intake of lean protein, and reducing intake of saturated fat and trans fat. Exercise recommendations include moderate aerobic physical activity for 150 minutes/week.        Current contraception:  Nexplanon  History of abnormal Pap smear: Misa today  Family history of uterine  "or ovarian cancer: no  Regular self breast exam: yes  History of abnormal mammogram: N/A  Family history of breast cancer: yes - Grandmother, HOPE Line provided  History of abnormal lipids: unknown  Menstrual History:  OB History          1    Para   1    Term   1       0    AB   0    Living   1         SAB   0    IAB   0    Ectopic   0    Multiple   0    Live Births   1                Menarche age: 10  No LMP recorded (lmp unknown). Patient has had an implant.       The following portions of the patient's history were reviewed and updated as appropriate: allergies, current medications, past family history, past medical history, past social history, past surgical history and problem list.    Review of Systems  Pertinent items are noted in HPI.      Objective      /84 (BP Location: Left arm, Patient Position: Sitting, Cuff Size: Standard)   Pulse 93   Ht 5' 2\" (1.575 m)   Wt 85.8 kg (189 lb 3.2 oz)   LMP  (LMP Unknown)   BMI 34.61 kg/m²     General: alert and oriented, in no acute distress, alert, appears stated age, and cooperative   Heart: NSR   Lungs: clear to auscultation bilaterally, WNL respiratory effort, negative cough or SOB   Thyroid: Negative masses palpable   Abdomen: soft, non-tender, without masses or organomegaly palpable   Vulva: normal   Vagina: normal mucosa   Cervix: no bleeding following Pap, no cervical motion tenderness, and no lesions   Uterus: normal size, non-tender, normal shape and consistency   Adnexa: normal adnexa   Urethra: normal   Breasts: NT,negative masses palpable, negative discharge, or dimpling             "

## 2024-09-09 NOTE — PATIENT INSTRUCTIONS
PAP and STD results can take up to 2 weeks  Remember safe sex and condom use  HOPE Line [phone]  Call with needs or concerns  Return in 1 year

## 2024-09-09 NOTE — LETTER
2024    To Teodora Gastelum  : 2005      This letter is to advise you that your recent CULTURES for gonorrhea and chlamydia were reviewed by me and are NORMAL.  Please contact the office for an appointment if you have any additional concerns.    CRYSTAL Barragan

## 2024-09-09 NOTE — LETTER
2024    To Teodora Gastelum  : 2005      This letter is to advise you that your recent PAP SMEAR results were reviewed by me and are NORMAL.  We will see you in 1 year for your annual exam.    CRYSTAL Barragan

## 2024-09-11 LAB
C TRACH DNA SPEC QL NAA+PROBE: NEGATIVE
N GONORRHOEA DNA SPEC QL NAA+PROBE: NEGATIVE

## 2024-09-12 LAB
LAB AP GYN PRIMARY INTERPRETATION: NORMAL
Lab: NORMAL

## 2024-10-31 ENCOUNTER — HOSPITAL ENCOUNTER (EMERGENCY)
Facility: HOSPITAL | Age: 19
Discharge: HOME | End: 2024-11-01
Attending: EMERGENCY MEDICINE
Payer: COMMERCIAL

## 2024-10-31 VITALS
DIASTOLIC BLOOD PRESSURE: 88 MMHG | HEART RATE: 116 BPM | BODY MASS INDEX: 35.48 KG/M2 | RESPIRATION RATE: 18 BRPM | SYSTOLIC BLOOD PRESSURE: 139 MMHG | OXYGEN SATURATION: 98 % | WEIGHT: 194 LBS | TEMPERATURE: 98.1 F

## 2024-10-31 DIAGNOSIS — N64.4 BREAST TENDERNESS IN FEMALE: ICD-10-CM

## 2024-10-31 DIAGNOSIS — Z32.02 ENCOUNTER FOR PREGNANCY TEST WITH RESULT NEGATIVE: Primary | ICD-10-CM

## 2024-10-31 LAB
EXT PREGNANCY TEST URINE: NEGATIVE
EXT. CONTROL: NORMAL

## 2024-10-31 PROCEDURE — 99284 EMERGENCY DEPT VISIT MOD MDM: CPT

## 2024-10-31 PROCEDURE — 81025 URINE PREGNANCY TEST: CPT

## 2024-10-31 PROCEDURE — 99282 EMERGENCY DEPT VISIT SF MDM: CPT

## 2024-11-01 NOTE — ED PROVIDER NOTES
Time reflects when diagnosis was documented in both MDM as applicable and the Disposition within this note       Time User Action Codes Description Comment    10/31/2024 11:51 PM Tayler Lowery Add [Z32.02] Encounter for pregnancy test with result negative     10/31/2024 11:51 PM Tayler Lowery Add [N64.4] Breast tenderness in female           ED Disposition       ED Disposition   Discharge    Condition   Stable    Date/Time   Thu Oct 31, 2024 11:54 PM    Comment   Teodora Gastelum discharge to home/self care.                   Assessment & Plan       Medical Decision Making  Patient with dysmenorrhea, breast tenderness, and abdominal discomfort presents requesting a pregnancy test.  Differential diagnosis includes but is not limited to pregnancy, threatened , ectopic pregnancy, dysmenorrhea, premenstrual syndrome, PCOS, or side effects of nexplanon.  Pregnancy test was negative.  Reviewed results with the patient and answered all questions to the best my ability.  Recommend she follow-up with her OB/GYN for further evaluation and contraceptive management.    Amount and/or Complexity of Data Reviewed  Labs: ordered. Decision-making details documented in ED Course.        ED Course as of 24 0131   Thu Oct 31, 2024   2350 PREGNANCY TEST URINE: Negative    Control: Valid       Medications - No data to display    ED Risk Strat Scores           SBIRT 22yo+      Flowsheet Row Most Recent Value   Initial Alcohol Screen: US AUDIT-C     1. How often do you have a drink containing alcohol? 0 Filed at: 10/31/2024 2330   2. How many drinks containing alcohol do you have on a typical day you are drinking?  0 Filed at: 10/31/2024 2330   3a. Male UNDER 65: How often do you have five or more drinks on one occasion? 0 Filed at: 10/31/2024 2330   3b. FEMALE Any Age, or MALE 65+: How often do you have 4 or more drinks on one occassion? 0 Filed at: 10/31/2024 2330   Audit-C Score 0 Filed at: 10/31/2024 2330   NATALYA:  How many times in the past year have you...    Used an illegal drug or used a prescription medication for non-medical reasons? Never Filed at: 10/31/2024 6660            History of Present Illness       Chief Complaint   Patient presents with    Pregnancy Test     Reports doing pregnancy test at home and it was a light color. Patient would like to confirm if she is pregnant or not with a blood test. No vaginal bleeding.       Past Medical History:   Diagnosis Date    Asthma       History reviewed. No pertinent surgical history.   Family History   Problem Relation Age of Onset    Hypertension Mother     Diabetes Maternal Grandmother     Cancer Maternal Grandmother       Social History     Tobacco Use    Smoking status: Never     Passive exposure: Never    Smokeless tobacco: Never   Vaping Use    Vaping status: Every Day    Substances: Nicotine   Substance Use Topics    Alcohol use: Not Currently    Drug use: Not Currently     Types: Marijuana      E-Cigarette/Vaping    E-Cigarette Use Current Every Day User       E-Cigarette/Vaping Substances    Nicotine Yes     THC No     CBD No     Flavoring No     Other No     Unknown No       I have reviewed and agree with the history as documented.     The patient is a 19 y.o. female with a past medical history of asthma, who presents for a pregnancy test.  She reports several days of breast tenderness and lower abdominal discomfort.  Patient had a nexplanon inserted over the summer and states that her menstrual cycles have been irregular since.  She had 4 days of spotting in the beginning of October, but did not have a full menstrual cycle.  No current vaginal bleeding.  The patient did take a pregnancy test at home and saw a faint second line.  She presents tonight requesting a pregnancy test for confirmation.          Review of Systems   Constitutional:  Negative for chills and fever.        + Bilateral breast tenderness   HENT:  Negative for ear pain and sore throat.     Eyes:  Negative for pain and visual disturbance.   Respiratory:  Negative for cough and shortness of breath.    Cardiovascular:  Negative for chest pain and palpitations.   Gastrointestinal:  Positive for abdominal pain. Negative for abdominal distention, diarrhea, nausea and vomiting.   Genitourinary:  Positive for menstrual problem. Negative for dysuria, hematuria, vaginal bleeding, vaginal discharge and vaginal pain.   Musculoskeletal:  Negative for arthralgias, back pain and myalgias.   Skin:  Negative for color change and rash.   Neurological:  Negative for seizures, syncope, light-headedness and headaches.   All other systems reviewed and are negative.      Objective       ED Triage Vitals [10/31/24 2332]   Temperature Pulse Blood Pressure Respirations SpO2 Patient Position - Orthostatic VS   98.1 °F (36.7 °C) (!) 116 139/88 18 98 % Sitting      Temp Source Heart Rate Source BP Location FiO2 (%) Pain Score    Tympanic Monitor Left arm -- --      Vitals      Date and Time Temp Pulse SpO2 Resp BP Pain Score FACES Pain Rating User   10/31/24 2332 98.1 °F (36.7 °C) 116 98 % 18 139/88 -- -- AM            Physical Exam  Vitals and nursing note reviewed.   Constitutional:       General: She is awake. She is not in acute distress.     Appearance: Normal appearance. She is well-developed and overweight. She is not toxic-appearing or diaphoretic.   HENT:      Head: Normocephalic and atraumatic.      Right Ear: External ear normal.      Left Ear: External ear normal.      Nose: Nose normal.      Mouth/Throat:      Lips: Pink.      Mouth: Mucous membranes are moist.   Eyes:      General: Lids are normal. Vision grossly intact. Gaze aligned appropriately.      Conjunctiva/sclera: Conjunctivae normal.      Pupils: Pupils are equal, round, and reactive to light.   Cardiovascular:      Rate and Rhythm: Regular rhythm. Tachycardia present.   Pulmonary:      Effort: Pulmonary effort is normal. No respiratory distress.    Abdominal:      General: Abdomen is flat.      Palpations: Abdomen is soft.   Musculoskeletal:      Cervical back: Normal, full passive range of motion without pain and neck supple.      Thoracic back: Normal.      Lumbar back: Normal.   Skin:     General: Skin is warm.      Capillary Refill: Capillary refill takes less than 2 seconds.      Coloration: Skin is not pale.      Findings: No rash.   Neurological:      Mental Status: She is alert and oriented to person, place, and time.   Psychiatric:         Attention and Perception: Attention normal.         Mood and Affect: Mood normal.         Speech: Speech normal.         Behavior: Behavior normal. Behavior is cooperative.         Results Reviewed       Procedure Component Value Units Date/Time    POCT pregnancy, urine [[id number]]  (Normal) Resulted: 10/31/24 2338    Lab Status: Final result Updated: 10/31/24 2338     EXT Preg Test, Ur Negative     Control Valid            No orders to display       Procedures    ED Medication and Procedure Management   Prior to Admission Medications   Prescriptions Last Dose Informant Patient Reported? Taking?   Prenatal Vit-Fe Fumarate-FA (Prenatal Complete) 14-0.4 MG TABS Not Taking Self No No   Sig: Take 1 tablet by mouth in the morning   Patient not taking: Reported on 12/28/2023   acetaminophen (TYLENOL) 325 mg tablet Not Taking Self No No   Sig: Take 2 tablets (650 mg total) by mouth every 4 (four) hours as needed for mild pain   Patient not taking: Reported on 9/9/2024   aluminum-magnesium hydroxide-simethicone (MAALOX MAX) 400-400-40 MG/5ML suspension Not Taking  No No   Sig: Take 10 mL by mouth every 6 (six) hours as needed for heartburn   Patient not taking: Reported on 9/9/2024   benzocaine-menthol-lanolin-aloe (DERMOPLAST) 20-0.5 % topical spray Not Taking Self No No   Sig: Apply 1 Application topically every 6 (six) hours as needed for mild pain or irritation   Patient not taking: Reported on 7/5/2023   etonogestrel  (NEXPLANON) subdermal implant  Self Yes No   Si mg by Subdermal route once   ferrous sulfate 324 (65 Fe) mg   No No   Sig: Take 1 tablet (324 mg total) by mouth 2 (two) times a day before meals   Patient not taking: Reported on 2023   ibuprofen (MOTRIN) 600 mg tablet Not Taking Self No No   Sig: Take 1 tablet (600 mg total) by mouth every 6 (six) hours   Patient not taking: Reported on 2024   ibuprofen (MOTRIN) 600 mg tablet Not Taking Self No No   Sig: Take 1 tablet (600 mg total) by mouth every 6 (six) hours as needed for moderate pain   Patient not taking: Reported on 2024   ondansetron (ZOFRAN) 4 mg tablet   No No   Sig: Take 1 tablet (4 mg total) by mouth every 6 (six) hours as needed for nausea or vomiting   Patient not taking: Reported on 2024   sucralfate (CARAFATE) 1 g tablet   No No   Sig: Take 1 tablet (1 g total) by mouth every 6 (six) hours as needed (abdominal pain; nausea) for up to 5 days   tamsulosin (FLOMAX) 0.4 mg   No No   Sig: Take 1 capsule (0.4 mg total) by mouth daily with dinner for 14 days   witch hazel-glycerin (TUCKS) topical pad Not Taking Self No No   Sig: Apply 1 Pad topically every 4 (four) hours as needed for irritation   Patient not taking: Reported on 2023      Facility-Administered Medications: None     Discharge Medication List as of 10/31/2024 11:54 PM        CONTINUE these medications which have NOT CHANGED    Details   acetaminophen (TYLENOL) 325 mg tablet Take 2 tablets (650 mg total) by mouth every 4 (four) hours as needed for mild pain, Starting 2023, No Print      aluminum-magnesium hydroxide-simethicone (MAALOX MAX) 400-400-40 MG/5ML suspension Take 10 mL by mouth every 6 (six) hours as needed for heartburn, Starting Wed 3/13/2024, Normal      benzocaine-menthol-lanolin-aloe (DERMOPLAST) 20-0.5 % topical spray Apply 1 Application topically every 6 (six) hours as needed for mild pain or irritation, Starting 2023, No Print       etonogestrel (NEXPLANON) subdermal implant 68 mg by Subdermal route once, Starting Tue 6/27/2023, Historical Med      ferrous sulfate 324 (65 Fe) mg Take 1 tablet (324 mg total) by mouth 2 (two) times a day before meals, Starting Thu 5/25/2023, Until Wed 8/23/2023, Normal      !! ibuprofen (MOTRIN) 600 mg tablet Take 1 tablet (600 mg total) by mouth every 6 (six) hours, Starting Tue 6/27/2023, No Print      !! ibuprofen (MOTRIN) 600 mg tablet Take 1 tablet (600 mg total) by mouth every 6 (six) hours as needed for moderate pain, Starting Tue 12/12/2023, Normal      ondansetron (ZOFRAN) 4 mg tablet Take 1 tablet (4 mg total) by mouth every 6 (six) hours as needed for nausea or vomiting, Starting Wed 3/13/2024, Normal      Prenatal Vit-Fe Fumarate-FA (Prenatal Complete) 14-0.4 MG TABS Take 1 tablet by mouth in the morning, Starting Mon 10/24/2022, Normal      sucralfate (CARAFATE) 1 g tablet Take 1 tablet (1 g total) by mouth every 6 (six) hours as needed (abdominal pain; nausea) for up to 5 days, Starting Wed 3/13/2024, Until Mon 3/18/2024 at 2359, Normal      tamsulosin (FLOMAX) 0.4 mg Take 1 capsule (0.4 mg total) by mouth daily with dinner for 14 days, Starting Tue 12/12/2023, Until Thu 12/28/2023, Normal      witch hazel-glycerin (TUCKS) topical pad Apply 1 Pad topically every 4 (four) hours as needed for irritation, Starting Tue 6/27/2023, No Print       !! - Potential duplicate medications found. Please discuss with provider.        No discharge procedures on file.  ED SEPSIS DOCUMENTATION   Time reflects when diagnosis was documented in both MDM as applicable and the Disposition within this note       Time User Action Codes Description Comment    10/31/2024 11:51 PM Tayler Lowery [Z32.02] Encounter for pregnancy test with result negative     10/31/2024 11:51 PM Tayler Lowery [N64.4] Breast tenderness in female                  Tayler Yady Lowery PA-C  11/01/24 6353

## 2024-12-17 ENCOUNTER — HOSPITAL ENCOUNTER (EMERGENCY)
Facility: HOSPITAL | Age: 19
Discharge: HOME | End: 2024-12-17
Attending: EMERGENCY MEDICINE
Payer: COMMERCIAL

## 2024-12-17 VITALS
RESPIRATION RATE: 20 BRPM | BODY MASS INDEX: 33.79 KG/M2 | TEMPERATURE: 99 F | SYSTOLIC BLOOD PRESSURE: 129 MMHG | WEIGHT: 184.75 LBS | OXYGEN SATURATION: 99 % | HEART RATE: 108 BPM | DIASTOLIC BLOOD PRESSURE: 73 MMHG

## 2024-12-17 DIAGNOSIS — J02.0 STREP PHARYNGITIS: Primary | ICD-10-CM

## 2024-12-17 LAB
EXT PREGNANCY TEST URINE: NEGATIVE
EXT. CONTROL: NORMAL
FLUAV AG UPPER RESP QL IA.RAPID: NEGATIVE
FLUBV AG UPPER RESP QL IA.RAPID: NEGATIVE
S PYO DNA THROAT QL NAA+PROBE: DETECTED
SARS-COV+SARS-COV-2 AG RESP QL IA.RAPID: NEGATIVE

## 2024-12-17 PROCEDURE — 87804 INFLUENZA ASSAY W/OPTIC: CPT | Performed by: EMERGENCY MEDICINE

## 2024-12-17 PROCEDURE — 81025 URINE PREGNANCY TEST: CPT

## 2024-12-17 PROCEDURE — 87651 STREP A DNA AMP PROBE: CPT | Performed by: EMERGENCY MEDICINE

## 2024-12-17 PROCEDURE — 87811 SARS-COV-2 COVID19 W/OPTIC: CPT | Performed by: EMERGENCY MEDICINE

## 2024-12-17 PROCEDURE — 99283 EMERGENCY DEPT VISIT LOW MDM: CPT

## 2024-12-17 PROCEDURE — 99284 EMERGENCY DEPT VISIT MOD MDM: CPT

## 2024-12-17 RX ORDER — AMOXICILLIN 500 MG/1
500 CAPSULE ORAL EVERY 12 HOURS SCHEDULED
Qty: 20 CAPSULE | Refills: 0 | Status: SHIPPED | OUTPATIENT
Start: 2024-12-17 | End: 2024-12-27

## 2024-12-17 NOTE — ED PROVIDER NOTES
Time reflects when diagnosis was documented in both MDM as applicable and the Disposition within this note       Time User Action Codes Description Comment    12/17/2024  7:10 AM Tayler Lowery Add [J02.0] Strep pharyngitis           ED Disposition       ED Disposition   Discharge    Condition   Stable    Date/Time   Tue Dec 17, 2024  7:23 AM    Comment   Teodora Gastelum discharge to home/self care.                   Assessment & Plan         Medical Decision Making  Patient presents for evaluation of cold-like symptoms.  She is mildly tachycardic, but afebrile and nontoxic-appearing.  Differential diagnosis includes but is not limited to viral illness, mononucleosis, strep pharyngitis, bacterial tonsillitis, URI, allergic rhinitis, serous otitis media, suppurative otitis media, or otitis externa.  Patient tested negative for COVID and influenza, however she did test positive for strep A.  A course of amoxicillin was initiated in the department and supportive care measures were discussed.  She is in agreement with the treatment plan and all questions were answered.  Return precautions discussed and she verbalized understanding.  Follow-up with PCP, but return to the ED in the interim with new or worsening symptoms.    Problems Addressed:  Strep pharyngitis: acute illness or injury    Amount and/or Complexity of Data Reviewed  Labs: ordered. Decision-making details documented in ED Course.    Risk  Prescription drug management.        ED Course as of 12/21/24 0611   Tue Dec 17, 2024   0636 STREP A PCR(!): Detected       Medications - No data to display    ED Risk Strat Scores          SBIRT 22yo+      Flowsheet Row Most Recent Value   Initial Alcohol Screen: US AUDIT-C     1. How often do you have a drink containing alcohol? 0 Filed at: 12/17/2024 0538   2. How many drinks containing alcohol do you have on a typical day you are drinking?  0 Filed at: 12/17/2024 0538   3a. Male UNDER 65: How often do you have five or  more drinks on one occasion? 0 Filed at: 12/17/2024 0538   3b. FEMALE Any Age, or MALE 65+: How often do you have 4 or more drinks on one occassion? 0 Filed at: 12/17/2024 0538   Audit-C Score 0 Filed at: 12/17/2024 0538   NATALYA: How many times in the past year have you...    Used an illegal drug or used a prescription medication for non-medical reasons? Never Filed at: 12/17/2024 0538              History of Present Illness       Chief Complaint   Patient presents with    Sore Throat     Sore throat with bilateral ear pain and fever - started yesterday. Last medication was 10pm.       Past Medical History:   Diagnosis Date    Asthma       History reviewed. No pertinent surgical history.   Family History   Problem Relation Age of Onset    Hypertension Mother     Diabetes Maternal Grandmother     Cancer Maternal Grandmother       Social History     Tobacco Use    Smoking status: Never     Passive exposure: Never    Smokeless tobacco: Never   Vaping Use    Vaping status: Every Day    Substances: Nicotine   Substance Use Topics    Alcohol use: Not Currently    Drug use: Not Currently     Types: Marijuana      E-Cigarette/Vaping    E-Cigarette Use Current Every Day User       E-Cigarette/Vaping Substances    Nicotine Yes     THC No     CBD No     Flavoring No     Other No     Unknown No       I have reviewed and agree with the history as documented.     The patient is a 19-year-old female with a past medical history of asthma, who presents for evaluation of cold-like symptoms.  She reports 2 days of a sore throat, headache, bilateral ear pain (right > left), and a fever.  She reports multiple sick contacts at work with similar symptoms.  No associated cough, congestion, vomiting, diarrhea, or rashes.  She took an OTC cold medicine at 10 PM without significant relief.      Sore Throat  Associated symptoms: ear pain and fever    Associated symptoms: no abdominal pain, no chest pain, no chills, no cough, no headaches, no  rash, no rhinorrhea and no shortness of breath        Review of Systems   Constitutional:  Positive for fever. Negative for appetite change and chills.   HENT:  Positive for ear pain and sore throat. Negative for congestion and rhinorrhea.    Eyes:  Negative for pain and visual disturbance.   Respiratory:  Negative for cough and shortness of breath.    Cardiovascular:  Negative for chest pain and palpitations.   Gastrointestinal:  Negative for abdominal pain, diarrhea, nausea and vomiting.   Genitourinary:  Negative for dysuria and hematuria.   Musculoskeletal:  Negative for arthralgias, back pain and myalgias.   Skin:  Negative for color change and rash.   Neurological:  Negative for seizures, syncope and headaches.   All other systems reviewed and are negative.      Objective       ED Triage Vitals [12/17/24 0544]   Temperature Pulse Blood Pressure Respirations SpO2 Patient Position - Orthostatic VS   99 °F (37.2 °C) (!) 119 106/87 20 98 % Sitting      Temp Source Heart Rate Source BP Location FiO2 (%) Pain Score    Oral Monitor Left arm -- --      Vitals      Date and Time Temp Pulse SpO2 Resp BP Pain Score FACES Pain Rating User   12/17/24 0727 -- 108 99 % 20 129/73 -- -- JN   12/17/24 0544 99 °F (37.2 °C) 119 98 % 20 106/87 -- -- KA            Physical Exam  Vitals and nursing note reviewed.   Constitutional:       General: She is awake. She is not in acute distress.     Appearance: She is well-developed and well-groomed. She is obese. She is not toxic-appearing or diaphoretic.   HENT:      Head: Normocephalic and atraumatic.      Right Ear: Tympanic membrane, ear canal and external ear normal.      Left Ear: Tympanic membrane, ear canal and external ear normal.      Nose: Mucosal edema present. No congestion or rhinorrhea.      Mouth/Throat:      Lips: Pink.      Mouth: Mucous membranes are moist.      Pharynx: Uvula midline. Pharyngeal swelling and posterior oropharyngeal erythema present. No oropharyngeal  exudate or uvula swelling.      Tonsils: No tonsillar exudate. 2+ on the right. 2+ on the left.   Eyes:      General: Lids are normal. Vision grossly intact. Gaze aligned appropriately.      Conjunctiva/sclera: Conjunctivae normal.      Pupils: Pupils are equal, round, and reactive to light.   Cardiovascular:      Rate and Rhythm: Regular rhythm. Tachycardia present.      Heart sounds: S1 normal and S2 normal. No murmur heard.     No friction rub. No gallop.   Pulmonary:      Effort: Pulmonary effort is normal. No respiratory distress.      Breath sounds: Normal breath sounds and air entry. No stridor or transmitted upper airway sounds. No wheezing, rhonchi or rales.   Abdominal:      General: Abdomen is flat.      Palpations: Abdomen is soft.      Tenderness: There is no abdominal tenderness. There is no guarding or rebound.   Musculoskeletal:         General: No swelling.      Cervical back: Normal, full passive range of motion without pain and neck supple. No rigidity or crepitus. No spinous process tenderness or muscular tenderness.      Thoracic back: Normal. No tenderness or bony tenderness.      Lumbar back: Normal. No tenderness or bony tenderness.   Lymphadenopathy:      Head:      Right side of head: No submental, submandibular, tonsillar, preauricular or posterior auricular adenopathy.      Left side of head: No submental, submandibular, tonsillar, preauricular or posterior auricular adenopathy.      Cervical: Cervical adenopathy present.      Right cervical: Superficial cervical adenopathy present. No deep or posterior cervical adenopathy.     Left cervical: No superficial, deep or posterior cervical adenopathy.   Skin:     General: Skin is warm and dry.      Capillary Refill: Capillary refill takes less than 2 seconds.      Coloration: Skin is not pale.      Findings: No wound.   Neurological:      Mental Status: She is alert and oriented to person, place, and time.   Psychiatric:         Attention and  Perception: Attention normal.         Mood and Affect: Mood normal.         Speech: Speech normal.         Behavior: Behavior normal. Behavior is cooperative.         Results Reviewed       Procedure Component Value Units Date/Time    Strep A PCR [[id number]]  (Abnormal) Collected: 12/17/24 0557    Lab Status: Final result Specimen: Throat Updated: 12/17/24 0632     STREP A PCR Detected    FLU/COVID Rapid Antigen (30 min. TAT) - Preferred screening test in ED [[id number]]  (Normal) Collected: 12/17/24 0557    Lab Status: Final result Specimen: Nares from Nose Updated: 12/17/24 0624     SARS COV Rapid Antigen Negative     Influenza A Rapid Antigen Negative     Influenza B Rapid Antigen Negative    Narrative:      This test has been performed using the [employer] Lis 2 FLU+SARS Antigen test under the Emergency Use Authorization (EUA). This test has been validated by the  and verified by the performing laboratory. The Lis uses lateral flow immunofluorescent sandwich assay to detect SARS-COV, Influenza A and Influenza B Antigen.     The [employer] Lis 2 SARS Antigen test does not differentiate between SARS-CoV and SARS-CoV-2.     Negative results are presumptive and may be confirmed with a molecular assay, if necessary, for patient management. Negative results do not rule out SARS-CoV-2 or influenza infection and should not be used as the sole basis for treatment or patient management decisions. A negative test result may occur if the level of antigen in a sample is below the limit of detection of this test.     Positive results are indicative of the presence of viral antigens, but do not rule out bacterial infection or co-infection with other viruses.     All test results should be used as an adjunct to clinical observations and other information available to the provider.    FOR PEDIATRIC PATIENTS - copy/paste COVID Guidelines URL to browser:  [email or web address]    POCT pregnancy, urine [[id number]]  (Normal) Collected: 24    Lab Status: Final result Updated: 24     EXT Preg Test, Ur Negative     Control Valid            No orders to display       Procedures    ED Medication and Procedure Management   Prior to Admission Medications   Prescriptions Last Dose Informant Patient Reported? Taking?   Prenatal Vit-Fe Fumarate-FA (Prenatal Complete) 14-0.4 MG TABS  Self No No   Sig: Take 1 tablet by mouth in the morning   Patient not taking: Reported on 2023   acetaminophen (TYLENOL) 325 mg tablet  Self No No   Sig: Take 2 tablets (650 mg total) by mouth every 4 (four) hours as needed for mild pain   Patient not taking: Reported on 2024   aluminum-magnesium hydroxide-simethicone (MAALOX MAX) 400-400-40 MG/5ML suspension   No No   Sig: Take 10 mL by mouth every 6 (six) hours as needed for heartburn   Patient not taking: Reported on 2024   benzocaine-menthol-lanolin-aloe (DERMOPLAST) 20-0.5 % topical spray  Self No No   Sig: Apply 1 Application topically every 6 (six) hours as needed for mild pain or irritation   Patient not taking: Reported on 2023   etonogestrel (NEXPLANON) subdermal implant  Self Yes No   Si mg by Subdermal route once   ferrous sulfate 324 (65 Fe) mg   No No   Sig: Take 1 tablet (324 mg total) by mouth 2 (two) times a day before meals   Patient not taking: Reported on 2023   ibuprofen (MOTRIN) 600 mg tablet  Self No No   Sig: Take 1 tablet (600 mg total) by mouth every 6 (six) hours   Patient not taking: Reported on 2024   ibuprofen (MOTRIN) 600 mg tablet  Self No No   Sig: Take 1 tablet (600 mg total) by mouth every 6 (six) hours as needed for moderate pain   Patient not taking: Reported on 2024   ondansetron (ZOFRAN) 4 mg tablet   No No   Sig: Take 1 tablet (4 mg total) by mouth every 6 (six) hours as needed for nausea or vomiting    Patient not taking: Reported on 9/9/2024   sucralfate (CARAFATE) 1 g tablet   No No   Sig: Take 1 tablet (1 g total) by mouth every 6 (six) hours as needed (abdominal pain; nausea) for up to 5 days   tamsulosin (FLOMAX) 0.4 mg   No No   Sig: Take 1 capsule (0.4 mg total) by mouth daily with dinner for 14 days   witch hazel-glycerin (TUCKS) topical pad  Self No No   Sig: Apply 1 Pad topically every 4 (four) hours as needed for irritation   Patient not taking: Reported on 7/5/2023      Facility-Administered Medications: None     Discharge Medication List as of 12/17/2024  7:26 AM        START taking these medications    Details   amoxicillin (AMOXIL) 500 mg capsule Take 1 capsule (500 mg total) by mouth every 12 (twelve) hours for 10 days, Starting Tue 12/17/2024, Until Fri 12/27/2024, Normal           CONTINUE these medications which have NOT CHANGED    Details   acetaminophen (TYLENOL) 325 mg tablet Take 2 tablets (650 mg total) by mouth every 4 (four) hours as needed for mild pain, Starting Tue 6/27/2023, No Print      aluminum-magnesium hydroxide-simethicone (MAALOX MAX) 400-400-40 MG/5ML suspension Take 10 mL by mouth every 6 (six) hours as needed for heartburn, Starting Wed 3/13/2024, Normal      benzocaine-menthol-lanolin-aloe (DERMOPLAST) 20-0.5 % topical spray Apply 1 Application topically every 6 (six) hours as needed for mild pain or irritation, Starting Tue 6/27/2023, No Print      etonogestrel (NEXPLANON) subdermal implant 68 mg by Subdermal route once, Starting Tue 6/27/2023, Historical Med      ferrous sulfate 324 (65 Fe) mg Take 1 tablet (324 mg total) by mouth 2 (two) times a day before meals, Starting Thu 5/25/2023, Until Wed 8/23/2023, Normal      !! ibuprofen (MOTRIN) 600 mg tablet Take 1 tablet (600 mg total) by mouth every 6 (six) hours, Starting Tue 6/27/2023, No Print      !! ibuprofen (MOTRIN) 600 mg tablet Take 1 tablet (600 mg total) by mouth every 6 (six) hours as needed for moderate  pain, Starting Tue 12/12/2023, Normal      ondansetron (ZOFRAN) 4 mg tablet Take 1 tablet (4 mg total) by mouth every 6 (six) hours as needed for nausea or vomiting, Starting Wed 3/13/2024, Normal      Prenatal Vit-Fe Fumarate-FA (Prenatal Complete) 14-0.4 MG TABS Take 1 tablet by mouth in the morning, Starting Mon 10/24/2022, Normal      sucralfate (CARAFATE) 1 g tablet Take 1 tablet (1 g total) by mouth every 6 (six) hours as needed (abdominal pain; nausea) for up to 5 days, Starting Wed 3/13/2024, Until Mon 3/18/2024 at 2359, Normal      tamsulosin (FLOMAX) 0.4 mg Take 1 capsule (0.4 mg total) by mouth daily with dinner for 14 days, Starting Tue 12/12/2023, Until Thu 12/28/2023, Normal      witch hazel-glycerin (TUCKS) topical pad Apply 1 Pad topically every 4 (four) hours as needed for irritation, Starting Tue 6/27/2023, No Print       !! - Potential duplicate medications found. Please discuss with provider.        No discharge procedures on file.  ED SEPSIS DOCUMENTATION   Time reflects when diagnosis was documented in both MDM as applicable and the Disposition within this note       Time User Action Codes Description Comment    12/17/2024  7:10 AM Tayler Lowery Add [J02.0] Strep pharyngitis                  Tayler Lowery PA-C  12/21/24 0611

## 2024-12-17 NOTE — Clinical Note
Teodora Gastelum was seen and treated in our emergency department on 12/17/2024.                Diagnosis:     Teodora  may return to work on return date.    She may return on this date: 12/19/2024         If you have any questions or concerns, please don't hesitate to call.      Tayler Lowery PA-C    ______________________________           _______________          _______________  Hospital Representative                              Date                                Time